# Patient Record
Sex: FEMALE | Race: WHITE | NOT HISPANIC OR LATINO | Employment: STUDENT | URBAN - METROPOLITAN AREA
[De-identification: names, ages, dates, MRNs, and addresses within clinical notes are randomized per-mention and may not be internally consistent; named-entity substitution may affect disease eponyms.]

---
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
## 2019-11-20 ENCOUNTER — TELEPHONE (OUTPATIENT)
Dept: OTOLARYNGOLOGY | Facility: CLINIC | Age: 19
End: 2019-11-20

## 2019-11-20 NOTE — TELEPHONE ENCOUNTER
----- Message from Ira Bryan sent at 11/20/2019 10:37 AM CST -----  Contact: patient  [phone]-please call above patient need to schedule an appointment thanks.

## 2019-11-25 ENCOUNTER — OFFICE VISIT (OUTPATIENT)
Dept: OTOLARYNGOLOGY | Facility: CLINIC | Age: 19
End: 2019-11-25
Payer: COMMERCIAL

## 2019-11-25 VITALS — WEIGHT: 136.88 LBS

## 2019-11-25 DIAGNOSIS — Q35.9 SUBMUCOUS CLEFT PALATE: ICD-10-CM

## 2019-11-25 DIAGNOSIS — H66.93 CHRONIC OTITIS MEDIA OF BOTH EARS: ICD-10-CM

## 2019-11-25 DIAGNOSIS — R62.52 SHORT STATURE: ICD-10-CM

## 2019-11-25 DIAGNOSIS — M41.9 SCOLIOSIS, UNSPECIFIED SCOLIOSIS TYPE, UNSPECIFIED SPINAL REGION: ICD-10-CM

## 2019-11-25 DIAGNOSIS — J45.909 ASTHMA, UNSPECIFIED ASTHMA SEVERITY, UNSPECIFIED WHETHER COMPLICATED, UNSPECIFIED WHETHER PERSISTENT: ICD-10-CM

## 2019-11-25 DIAGNOSIS — H92.12 OTORRHEA OF LEFT EAR: ICD-10-CM

## 2019-11-25 DIAGNOSIS — H72.92 PERFORATED TYMPANIC MEMBRANE, LEFT: ICD-10-CM

## 2019-11-25 DIAGNOSIS — H61.23 BILATERAL IMPACTED CERUMEN: ICD-10-CM

## 2019-11-25 DIAGNOSIS — H92.02 OTALGIA, LEFT EAR: Primary | ICD-10-CM

## 2019-11-25 DIAGNOSIS — H71.12 GRANULOMA OF TYMPANIC MEMBRANE OF LEFT EAR: ICD-10-CM

## 2019-11-25 PROCEDURE — 99999 PR PBB SHADOW E&M-EST. PATIENT-LVL III: CPT | Mod: PBBFAC,,, | Performed by: OTOLARYNGOLOGY

## 2019-11-25 PROCEDURE — 69210 REMOVE IMPACTED EAR WAX UNI: CPT | Mod: S$GLB,,, | Performed by: OTOLARYNGOLOGY

## 2019-11-25 PROCEDURE — 69210 PR REMOVAL IMPACTED CERUMEN REQUIRING INSTRUMENTATION, UNILATERAL: ICD-10-PCS | Mod: S$GLB,,, | Performed by: OTOLARYNGOLOGY

## 2019-11-25 PROCEDURE — 99999 PR PBB SHADOW E&M-EST. PATIENT-LVL III: ICD-10-PCS | Mod: PBBFAC,,, | Performed by: OTOLARYNGOLOGY

## 2019-11-25 PROCEDURE — 99214 OFFICE O/P EST MOD 30 MIN: CPT | Mod: 25,S$GLB,, | Performed by: OTOLARYNGOLOGY

## 2019-11-25 PROCEDURE — 99214 PR OFFICE/OUTPT VISIT, EST, LEVL IV, 30-39 MIN: ICD-10-PCS | Mod: 25,S$GLB,, | Performed by: OTOLARYNGOLOGY

## 2019-11-25 RX ORDER — CIPROFLOXACIN AND DEXAMETHASONE 3; 1 MG/ML; MG/ML
SUSPENSION/ DROPS AURICULAR (OTIC)
Qty: 7.5 ML | Refills: 0 | Status: SHIPPED | OUTPATIENT
Start: 2019-11-25 | End: 2019-11-27 | Stop reason: SDUPTHER

## 2019-11-25 RX ORDER — CLINDAMYCIN HYDROCHLORIDE 300 MG/1
300 CAPSULE ORAL EVERY 8 HOURS
Qty: 42 CAPSULE | Refills: 0 | Status: SHIPPED | OUTPATIENT
Start: 2019-11-25 | End: 2019-12-09

## 2019-11-25 NOTE — PROGRESS NOTES
Subjective:       Patient ID: Jennifer Severino is a 19 y.o. female.    Chief Complaint: Otitis Media and Perforated tympanic membrane AS    HPI     The pt is a 19 y.o. female with a history of pain in the the left ear. The pain has been present for 2 months. The pain is described as moderate. The pain is associated with drainage and HL AS . There is no history of trauma, foreign body insertion and sharp object insertion into the ear.    There is a prior history of tube insertion x 5 AD and x 4 AS. There is a history of a known TM perforation on the affected side. There is no history of wetting the affected ear prior to the onset of the problem.      The patient has been treated with the following ear drops: c dex. The patient has been treated with the following antibiotics: Azithromycin  X 2 in 2 mos . There has been no relief with this treatment.     Has SMCP.      Review of Systems   Constitutional: Negative for chills, fever and unexpected weight change.   HENT: Positive for ear pain ( AS) and hearing loss. Negative for facial swelling and trouble swallowing.         SMCP  C OM w MT x 5 As/x 4 AD   Eyes: Negative for visual disturbance.   Respiratory: Positive for cough. Negative for wheezing and stridor.         Asthma as child - resolved   Cardiovascular: Negative for chest pain.        No CHD   Gastrointestinal: Negative for nausea and vomiting.        Neg for GERD   Genitourinary: Negative for enuresis.        Neg for congenital abn   Musculoskeletal: Negative.  Negative for arthralgias and myalgias.        Scoliosis surg 13 yo    Skin: Negative for rash.        eczema   Neurological: Negative for seizures, speech difficulty and headaches.   Hematological: Negative for adenopathy. Does not bruise/bleed easily.   Psychiatric/Behavioral: Negative for behavioral problems.        ADHD       Objective:      Physical Exam   Constitutional: She is oriented to person, place, and time. She appears well-developed and  well-nourished. No distress.   Short stature; no nasality   HENT:   Head: Normocephalic.   Right Ear: External ear and ear canal normal. No drainage. Tympanic membrane is scarred (patent PET.    ). No middle ear effusion.   Left Ear: External ear and ear canal normal. No drainage. Tympanic membrane is perforated (8%  tiny pinpoint ant inf perf) and erythematous (granular - colymycin ; ci removed 1st). Left ear scarred TM: no PET; 8% perf w nsacnt dc    No middle ear effusion.   Ears:    Nose: Nose normal. No nasal deformity.   Mouth/Throat: Oropharynx is clear and moist and mucous membranes are normal. 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 Tonsils are 1+ on the right. Tonsils are 1+ on the left.       Eyes: Pupils are equal, round, and reactive to light. Conjunctivae, EOM and lids are normal.   Neck: Trachea normal and normal range of motion. No thyroid mass present.   Cardiovascular: Normal rate and regular rhythm.   Pulmonary/Chest: Effort normal and breath sounds normal. No respiratory distress.   Musculoskeletal: Normal range of motion.   Lymphadenopathy:     She has no cervical adenopathy.   Neurological: She is alert and oriented to person, place, and time. No cranial nerve deficit.   Skin: Skin is warm. No rash noted.   Psychiatric: She has a normal mood and affect. Her speech is normal and behavior is normal. Thought content normal.         Cerumen removal: Ears cleared under microscopic vision with curette, forceps and suction as necessary. Child appropriately restrained by parent or/and papoose board.   Assessment:       1. Otalgia, left ear    2. Granuloma of tympanic membrane of left ear    3. Perforated tympanic membrane, left    4. Otorrhea of left ear    5. Chronic otitis media of both ears w MT X 5 AS and x 4 AD     6. Bilateral impacted cerumen    7. Submucous cleft palate    8. Short stature    9. Asthma, unspecified asthma severity, unspecified whether complicated, unspecified  whether persistent    10. Scoliosis, unspecified scoliosis type, unspecified spinal region        Plan:       1. clinda 300 tid x 14 d   (anaphylaxis w Pen ) 2 c dex x 10 d   3 RTC 2 wks

## 2019-11-27 ENCOUNTER — TELEPHONE (OUTPATIENT)
Dept: OTOLARYNGOLOGY | Facility: CLINIC | Age: 19
End: 2019-11-27

## 2019-11-27 RX ORDER — CIPROFLOXACIN AND DEXAMETHASONE 3; 1 MG/ML; MG/ML
SUSPENSION/ DROPS AURICULAR (OTIC)
Qty: 7.5 ML | Refills: 0 | Status: SHIPPED | OUTPATIENT
Start: 2019-11-27 | End: 2020-03-25

## 2019-11-27 RX ORDER — DEXAMETHASONE SODIUM PHOSPHATE 1 MG/ML
SOLUTION/ DROPS OPHTHALMIC
Qty: 5 ML | Refills: 2 | Status: SHIPPED | OUTPATIENT
Start: 2019-11-27 | End: 2020-05-12 | Stop reason: SDUPTHER

## 2019-11-27 NOTE — TELEPHONE ENCOUNTER
----- Message from Vandana Oshea sent at 11/27/2019  2:26 PM CST -----  Contact: [facility]   Type:  Pharmacy Calling to Clarify an RX    Name of Caller:  Gwendolyn   Pharmacy Name:  [facility]   Prescription Name:  Cipro Dex and the second dexamethasone  What do they need to clarify?:  Pharmacy said its duplicate therapy   Best Call Back Number:  [phone]  Additional Information:  None    DAVEY

## 2019-11-27 NOTE — TELEPHONE ENCOUNTER
----- Message from Megan Fragoso sent at 11/27/2019 10:09 AM CST -----  Contact: Jennifer  tel:   [phone]   Caller asking for you to send in to : [facility]   t el:  [phone]  / Asking you to send in Cipro Eye Drops if possible.    Pls call w/ a confirmation when this has been sent.

## 2020-01-06 ENCOUNTER — TELEPHONE (OUTPATIENT)
Dept: OTOLARYNGOLOGY | Facility: CLINIC | Age: 20
End: 2020-01-06

## 2020-01-06 NOTE — TELEPHONE ENCOUNTER
----- Message from Perla Burden sent at 1/6/2020  4:13 PM CST -----  Contact: pt   Maria Fernanda -- pt want to know if you could schedule her to see Dr. Callejas this week or next.  Call back # [phone]

## 2020-01-10 NOTE — PROGRESS NOTES
"Subjective:       Patient ID: Jennifer Severino is a 19 y.o. female.    Chief Complaint: Otalgia AS    HPI       The pt is a 19 y.o. female seen on 11/25/19 with a history of pain in the the left ear.     The hx is as follows. The pain has been present for 2 months. The pain is described as moderate. The pain is associated with drainage and HL AS . There is no history of trauma, foreign body insertion and sharp object insertion into the ear.    There is a prior history of tube insertion x 5 AD and x 4 AS. There is a history of a known TM perforation on the affected side. There is no history of wetting the affected ear prior to the onset of the problem.      The patient has been treated with the following ear drops: c dex. The patient has been treated with the following antibiotics: Azithromycin  X 2 in 2 mos . There has been no relief with this treatment.     Has SMCP.    Had some dc thru perf last ck on 11/25/19 . Rx = 1. clinda 300 tid x 14 d   (anaphylaxis w Pen ) 2 c dex x 10 d  In for FU.    Since her last visit, she noted improvement in ear pain after completing treatment however hearing remains "muffled" in left ear.  Denies any drainage from ear. No balance disturbance.       Review of Systems   Constitutional: Negative for chills, fever and unexpected weight change.   HENT: Positive for ear pain ( AS, improved) and hearing loss. Negative for facial swelling and trouble swallowing.         SMCP  C OM w MT x 5 As/x 4 AD   Eyes: Negative for visual disturbance.   Respiratory: Negative for cough, wheezing and stridor.         Asthma as child - resolved   Cardiovascular: Negative for chest pain.        No CHD   Gastrointestinal: Negative for nausea and vomiting.        Neg for GERD   Genitourinary: Negative for enuresis.        Neg for congenital abn   Musculoskeletal: Negative.  Negative for arthralgias and myalgias.        Scoliosis surg 13 yo    Skin: Negative for rash.        eczema   Neurological: Negative " for seizures, speech difficulty and headaches.   Hematological: Negative for adenopathy. Does not bruise/bleed easily.   Psychiatric/Behavioral: Negative for behavioral problems.        ADHD       Objective:      Physical Exam   Constitutional: She is oriented to person, place, and time. She appears well-developed and well-nourished. No distress.   Short stature; no nasality   HENT:   Head: Normocephalic.   Right Ear: External ear and ear canal normal. There is drainage (scant dc w granuloma on PET). Tympanic membrane is scarred (patent PET.  granulation tissue present.  ). No middle ear effusion.   Left Ear: External ear and ear canal normal. No drainage. Tympanic membrane is perforated (1%  tiny pinpoint ant inf perf ) and erythematous (granular  ). Left ear scarred TM: no PET; 8% perf w nsacnt dc    No middle ear effusion.   Ears:    Nose: Nose normal. No nasal deformity.   Mouth/Throat: Oropharynx is clear and moist and mucous membranes are normal. 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 Tonsils are 1+ on the right. Tonsils are 1+ on the left.       Eyes: Pupils are equal, round, and reactive to light. Conjunctivae, EOM and lids are normal.   Neck: Trachea normal and normal range of motion. No thyroid mass present.   Cardiovascular: Normal rate and regular rhythm.   Pulmonary/Chest: Effort normal and breath sounds normal. No respiratory distress.   Musculoskeletal: Normal range of motion.   Lymphadenopathy:     She has no cervical adenopathy.   Neurological: She is alert and oriented to person, place, and time. No cranial nerve deficit.   Skin: Skin is warm. No rash noted.   Psychiatric: She has a normal mood and affect. Her speech is normal and behavior is normal. Thought content normal.         Cerumen removal: Ears cleared under microscopic vision with curette, forceps and suction as necessary. Child appropriately restrained by parent or/and papoose board.               Assessment:       1.  Perforated tympanic membrane, left- persistent    2. Chronic otitis media of both ears w MT X 5 AS and x 4 AD     3. Granuloma of tympanic membrane of both ears    4. Conductive hearing loss, bilateral    5. Bilateral impacted cerumen    6. Submucous cleft palate    7. Short stature    8. Asthma, unspecified asthma severity, unspecified whether complicated, unspecified whether persistent    9. Scoliosis, unspecified scoliosis type, unspecified spinal region        Plan:       1. cdex x 10 d and prn    2 RTC q 3 mos for cleaning

## 2020-01-15 ENCOUNTER — OFFICE VISIT (OUTPATIENT)
Dept: OTOLARYNGOLOGY | Facility: CLINIC | Age: 20
End: 2020-01-15
Payer: COMMERCIAL

## 2020-01-15 ENCOUNTER — CLINICAL SUPPORT (OUTPATIENT)
Dept: AUDIOLOGY | Facility: CLINIC | Age: 20
End: 2020-01-15
Payer: COMMERCIAL

## 2020-01-15 VITALS — WEIGHT: 138.88 LBS

## 2020-01-15 DIAGNOSIS — H72.92 PERFORATED TYMPANIC MEMBRANE, LEFT: Primary | ICD-10-CM

## 2020-01-15 DIAGNOSIS — J45.909 ASTHMA, UNSPECIFIED ASTHMA SEVERITY, UNSPECIFIED WHETHER COMPLICATED, UNSPECIFIED WHETHER PERSISTENT: ICD-10-CM

## 2020-01-15 DIAGNOSIS — R62.52 SHORT STATURE: ICD-10-CM

## 2020-01-15 DIAGNOSIS — H90.0 CONDUCTIVE HEARING LOSS, BILATERAL: ICD-10-CM

## 2020-01-15 DIAGNOSIS — H61.23 BILATERAL IMPACTED CERUMEN: ICD-10-CM

## 2020-01-15 DIAGNOSIS — H71.13 GRANULOMA OF TYMPANIC MEMBRANE OF BOTH EARS: ICD-10-CM

## 2020-01-15 DIAGNOSIS — Q35.9 SUBMUCOUS CLEFT PALATE: ICD-10-CM

## 2020-01-15 DIAGNOSIS — M41.9 SCOLIOSIS, UNSPECIFIED SCOLIOSIS TYPE, UNSPECIFIED SPINAL REGION: ICD-10-CM

## 2020-01-15 DIAGNOSIS — H90.0 CONDUCTIVE HEARING LOSS, BILATERAL: Primary | ICD-10-CM

## 2020-01-15 DIAGNOSIS — H66.93 CHRONIC OTITIS MEDIA OF BOTH EARS: ICD-10-CM

## 2020-01-15 PROCEDURE — 69210 PR REMOVAL IMPACTED CERUMEN REQUIRING INSTRUMENTATION, UNILATERAL: ICD-10-PCS | Mod: S$GLB,,, | Performed by: OTOLARYNGOLOGY

## 2020-01-15 PROCEDURE — 69210 REMOVE IMPACTED EAR WAX UNI: CPT | Mod: S$GLB,,, | Performed by: OTOLARYNGOLOGY

## 2020-01-15 PROCEDURE — 92557 COMPREHENSIVE HEARING TEST: CPT | Mod: S$GLB,,, | Performed by: AUDIOLOGIST

## 2020-01-15 PROCEDURE — 99999 PR PBB SHADOW E&M-EST. PATIENT-LVL I: ICD-10-PCS | Mod: PBBFAC,,,

## 2020-01-15 PROCEDURE — 92557 PR COMPREHENSIVE HEARING TEST: ICD-10-PCS | Mod: S$GLB,,, | Performed by: AUDIOLOGIST

## 2020-01-15 PROCEDURE — 99214 PR OFFICE/OUTPT VISIT, EST, LEVL IV, 30-39 MIN: ICD-10-PCS | Mod: 25,S$GLB,, | Performed by: OTOLARYNGOLOGY

## 2020-01-15 PROCEDURE — 99999 PR PBB SHADOW E&M-EST. PATIENT-LVL III: CPT | Mod: PBBFAC,,, | Performed by: OTOLARYNGOLOGY

## 2020-01-15 PROCEDURE — 99999 PR PBB SHADOW E&M-EST. PATIENT-LVL III: ICD-10-PCS | Mod: PBBFAC,,, | Performed by: OTOLARYNGOLOGY

## 2020-01-15 PROCEDURE — 99214 OFFICE O/P EST MOD 30 MIN: CPT | Mod: 25,S$GLB,, | Performed by: OTOLARYNGOLOGY

## 2020-01-15 PROCEDURE — 99999 PR PBB SHADOW E&M-EST. PATIENT-LVL I: CPT | Mod: PBBFAC,,,

## 2020-01-15 NOTE — PROGRESS NOTES
Jennifer Severino was seen today in the clinic for an audiologic evaluation.  Patients main complaint was difficulty hearing of both ears.  Ms. Severino reported a history of bilateral hearing loss due to a left TM perforation.    Otoscopy: RIGHT: PE tube noted in place; LEFT: Noted TM perforation  Tympanometry: could not be obtained, unable to obtain seal, Au.    Audiogram results revealed moderate to mild conductive hearing loss (250-500 Hz) to normal hearing (8092-7364 Hz) with a noted conductive component present at 4000 Hz in the right ear and mild conductive hearing loss (250-500 Hz) to normal hearing (8510-1204 Hz) with noted components present at 1 and 4kHz to a mild high frequency hearing loss at 8kHz in the left ear.  Speech reception thresholds were noted at 15 dB in the right ear and 20 dB in the left ear.  Speech discrimination scores were 100% in the right ear and 100% in the left ear.    Recommendations:  1. Otologic evaluation  2. Repeat audiogram as needed  3. Noise protection when in noise

## 2020-03-25 ENCOUNTER — TELEPHONE (OUTPATIENT)
Dept: OTOLARYNGOLOGY | Facility: CLINIC | Age: 20
End: 2020-03-25

## 2020-03-25 RX ORDER — CIPROFLOXACIN AND DEXAMETHASONE 3; 1 MG/ML; MG/ML
SUSPENSION/ DROPS AURICULAR (OTIC)
Qty: 7.5 ML | Refills: 0 | Status: SHIPPED | OUTPATIENT
Start: 2020-03-25 | End: 2020-03-25 | Stop reason: ALTCHOICE

## 2020-03-25 RX ORDER — NEOMYCIN SULFATE, POLYMYXIN B SULFATE AND HYDROCORTISONE 10; 3.5; 1 MG/ML; MG/ML; [USP'U]/ML
SUSPENSION/ DROPS AURICULAR (OTIC)
Qty: 10 ML | Refills: 0 | Status: SHIPPED | OUTPATIENT
Start: 2020-03-25 | End: 2020-05-12

## 2020-03-25 NOTE — TELEPHONE ENCOUNTER
----- Message from Vandana Oshea sent at 3/25/2020  1:37 PM CDT -----  Contact: pts mom   Rx Refill/Request     Is this a Refill or New Rx:  Refill   Rx Name and Strength:  ciprofloxacin-dexamethasone 0.3-0.1% (CIPRODEX) 0.3-0.1 % [place]  Preferred Pharmacy with phone number: [facility] phone number [phone]  Communication Preference: can you please call pts mom at [phone]  Additional Information: none    DAVEY

## 2020-03-25 NOTE — TELEPHONE ENCOUNTER
----- Message from Kathy Montgomerylars sent at 3/25/2020  2:12 PM CDT -----  Contact: pt at [phone]  Britta pt-taina-Pt states that ciprodex was called in today dn ot not covered by her insurance.  Do we have a coupon or can something else be called in.  Pt uses [facility] at [phone].  Freddie pt with update.

## 2020-05-11 ENCOUNTER — TELEPHONE (OUTPATIENT)
Dept: OTOLARYNGOLOGY | Facility: CLINIC | Age: 20
End: 2020-05-11

## 2020-05-11 NOTE — TELEPHONE ENCOUNTER
----- Message from Shira Bryant sent at 5/11/2020  9:30 AM CDT -----  Contact: Patient  Reason: Patient states that none of the medications are working. Have been running fever and states inside ear is really red        Contact: [phone]

## 2020-05-12 ENCOUNTER — OFFICE VISIT (OUTPATIENT)
Dept: OTOLARYNGOLOGY | Facility: CLINIC | Age: 20
End: 2020-05-12
Payer: COMMERCIAL

## 2020-05-12 VITALS — HEIGHT: 56 IN | BODY MASS INDEX: 33.38 KG/M2 | WEIGHT: 148.38 LBS

## 2020-05-12 DIAGNOSIS — H61.23 BILATERAL IMPACTED CERUMEN: ICD-10-CM

## 2020-05-12 DIAGNOSIS — Q35.9 SUBMUCOUS CLEFT PALATE: ICD-10-CM

## 2020-05-12 DIAGNOSIS — H90.0 CONDUCTIVE HEARING LOSS, BILATERAL: ICD-10-CM

## 2020-05-12 DIAGNOSIS — H72.92 PERFORATED TYMPANIC MEMBRANE, LEFT: ICD-10-CM

## 2020-05-12 DIAGNOSIS — H92.03 OTALGIA OF BOTH EARS: Primary | ICD-10-CM

## 2020-05-12 DIAGNOSIS — H71.13 GRANULOMA OF TYMPANIC MEMBRANE OF BOTH EARS: ICD-10-CM

## 2020-05-12 PROCEDURE — 69210 PR REMOVAL IMPACTED CERUMEN REQUIRING INSTRUMENTATION, UNILATERAL: ICD-10-PCS | Mod: S$GLB,,, | Performed by: OTOLARYNGOLOGY

## 2020-05-12 PROCEDURE — 99214 PR OFFICE/OUTPT VISIT, EST, LEVL IV, 30-39 MIN: ICD-10-PCS | Mod: 25,S$GLB,, | Performed by: OTOLARYNGOLOGY

## 2020-05-12 PROCEDURE — 3008F PR BODY MASS INDEX (BMI) DOCUMENTED: ICD-10-PCS | Mod: CPTII,S$GLB,, | Performed by: OTOLARYNGOLOGY

## 2020-05-12 PROCEDURE — 99214 OFFICE O/P EST MOD 30 MIN: CPT | Mod: 25,S$GLB,, | Performed by: OTOLARYNGOLOGY

## 2020-05-12 PROCEDURE — 99999 PR PBB SHADOW E&M-EST. PATIENT-LVL III: ICD-10-PCS | Mod: PBBFAC,,, | Performed by: OTOLARYNGOLOGY

## 2020-05-12 PROCEDURE — 99999 PR PBB SHADOW E&M-EST. PATIENT-LVL III: CPT | Mod: PBBFAC,,, | Performed by: OTOLARYNGOLOGY

## 2020-05-12 PROCEDURE — 3008F BODY MASS INDEX DOCD: CPT | Mod: CPTII,S$GLB,, | Performed by: OTOLARYNGOLOGY

## 2020-05-12 PROCEDURE — 69210 REMOVE IMPACTED EAR WAX UNI: CPT | Mod: S$GLB,,, | Performed by: OTOLARYNGOLOGY

## 2020-05-12 RX ORDER — DEXAMETHASONE SODIUM PHOSPHATE 1 MG/ML
SOLUTION/ DROPS OPHTHALMIC
Qty: 5 ML | Refills: 2 | Status: SHIPPED | OUTPATIENT
Start: 2020-05-12 | End: 2021-10-27

## 2020-05-12 RX ORDER — OFLOXACIN 3 MG/ML
5 SOLUTION AURICULAR (OTIC) 2 TIMES DAILY
Qty: 10 ML | Refills: 5 | Status: SHIPPED | OUTPATIENT
Start: 2020-05-12 | End: 2020-06-25

## 2020-05-12 NOTE — PROGRESS NOTES
"Subjective:       Patient ID: Jennifer Severino is a 19 y.o. female.    Chief Complaint: Otalgia and Otitis Media      The pt is a 19 y.o. female her today for bilateral ear pain that began two months ago. Patient reports she was started on cortisporin drops on 3/25. Pain has persisted. Occasional fever.      The hx is as follows.   The pain is associated with drainage and HL AS . There is no history of trauma, foreign body insertion and sharp object insertion into the ear.    There is a prior history of tube insertion x 5 AD and x 4 AS. There is a history of a known TM perforation on the affected side. There is no history of wetting the affected ear prior to the onset of the problem.      The patient has been treated with the following ear drops: c dex. The patient has been treated with the following antibiotics: Azithromycin  X 2 in 2 mos . There has been no relief with this treatment.     Has SMCP.    Had some dc thru perf last ck on 11/25/19 . Rx = 1. clinda 300 tid x 14 d   (anaphylaxis w Pen ) 2 c dex x 10 d  In for FU.    Since her last visit, she noted improvement in ear pain after completing treatment however hearing remains "muffled" in left ear.  Denies any drainage from ear. No balance disturbance.       Review of Systems   Constitutional: Negative for chills, fever and unexpected weight change.   HENT: Positive for ear pain and hearing loss. Negative for facial swelling and trouble swallowing.         SMCP  C OM w MT x 5 As/x 4 AD   Eyes: Negative for visual disturbance.   Respiratory: Negative for cough, wheezing and stridor.         Asthma as child - resolved   Cardiovascular: Negative for chest pain.        No CHD   Gastrointestinal: Negative for nausea and vomiting.        Neg for GERD   Genitourinary: Negative for enuresis.        Neg for congenital abn   Musculoskeletal: Negative.  Negative for arthralgias and myalgias.        Scoliosis surg 13 yo    Skin: Negative for rash.        eczema "   Neurological: Negative for seizures, speech difficulty and headaches.   Hematological: Negative for adenopathy. Does not bruise/bleed easily.   Psychiatric/Behavioral: Negative for behavioral problems.        ADHD       Objective:      Physical Exam   Constitutional: She is oriented to person, place, and time. She appears well-developed and well-nourished. No distress.   Short stature; no nasality   HENT:   Head: Normocephalic.   Right Ear: External ear and ear canal normal. There is drainage (scant dc w granuloma on PET). Tympanic membrane is scarred (patent PET.  granulation tissue present.  ). No middle ear effusion.   Left Ear: External ear and ear canal normal. No drainage. Tympanic membrane is perforated (cannot see 1%  tiny pinpoint ant inf perf  ) and erythematous (granular  ). Left ear scarred TM: no PET; 8% perf w nsacnt dc    No middle ear effusion.   Ears:    Nose: Nose normal. No nasal deformity.   Mouth/Throat: Oropharynx is clear and moist and mucous membranes are normal. 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 Tonsils are 1+ on the right. Tonsils are 1+ on the left.       Eyes: Pupils are equal, round, and reactive to light. Conjunctivae, EOM and lids are normal.   Neck: Trachea normal and normal range of motion. No thyroid mass present.   Cardiovascular: Normal rate and regular rhythm.   Pulmonary/Chest: Effort normal and breath sounds normal. No respiratory distress.   Musculoskeletal: Normal range of motion.   Lymphadenopathy:     She has no cervical adenopathy.   Neurological: She is alert and oriented to person, place, and time. No cranial nerve deficit.   Skin: Skin is warm. No rash noted.   Psychiatric: She has a normal mood and affect. Her speech is normal and behavior is normal. Thought content normal.         Cerumen removal: Ears cleared under microscopic vision with curette, forceps and suction as necessary. Child appropriately restrained by parent or/and papoose  board.               Assessment:       1. Otalgia of both ears    2. Perforated tympanic membrane, left- persistent    3. Granuloma of tympanic membrane of both ears    4. Conductive hearing loss, bilateral    5. Bilateral impacted cerumen    6. Submucous cleft palate        Plan:       1. Otalgia: decadron optho ggts + ofloxacin    2 RTC q 3 mos for cleaning    3 RTC  3 wks for re ck .

## 2020-06-02 ENCOUNTER — TELEPHONE (OUTPATIENT)
Dept: OTOLARYNGOLOGY | Facility: CLINIC | Age: 20
End: 2020-06-02

## 2020-06-02 NOTE — TELEPHONE ENCOUNTER
----- Message from Kathy Forrestkarey sent at 6/2/2020  4:04 PM CDT -----  Contact: pt at [phone]  Britta Hines-Pt states that you book her appts.  Pt has ear pain.  Doesn't think that her drops are working.

## 2020-06-04 ENCOUNTER — OFFICE VISIT (OUTPATIENT)
Dept: OTOLARYNGOLOGY | Facility: CLINIC | Age: 20
End: 2020-06-04
Payer: COMMERCIAL

## 2020-06-04 ENCOUNTER — CLINICAL SUPPORT (OUTPATIENT)
Dept: AUDIOLOGY | Facility: CLINIC | Age: 20
End: 2020-06-04
Payer: COMMERCIAL

## 2020-06-04 VITALS — WEIGHT: 147.69 LBS | HEIGHT: 55 IN | BODY MASS INDEX: 34.18 KG/M2

## 2020-06-04 DIAGNOSIS — H92.03 OTALGIA OF BOTH EARS: ICD-10-CM

## 2020-06-04 DIAGNOSIS — R62.52 SHORT STATURE: ICD-10-CM

## 2020-06-04 DIAGNOSIS — H90.0 CONDUCTIVE HEARING LOSS, BILATERAL: ICD-10-CM

## 2020-06-04 DIAGNOSIS — H69.93 DYSFUNCTION OF BOTH EUSTACHIAN TUBES: ICD-10-CM

## 2020-06-04 DIAGNOSIS — H90.0 CONDUCTIVE HEARING LOSS OF BOTH EARS: Primary | ICD-10-CM

## 2020-06-04 DIAGNOSIS — Q35.9 SUBMUCOUS CLEFT PALATE: ICD-10-CM

## 2020-06-04 DIAGNOSIS — H65.92 MIDDLE EAR EFFUSION, LEFT: Primary | ICD-10-CM

## 2020-06-04 PROCEDURE — 99999 PR PBB SHADOW E&M-EST. PATIENT-LVL II: ICD-10-PCS | Mod: PBBFAC,,, | Performed by: OTOLARYNGOLOGY

## 2020-06-04 PROCEDURE — 99214 PR OFFICE/OUTPT VISIT, EST, LEVL IV, 30-39 MIN: ICD-10-PCS | Mod: S$GLB,,, | Performed by: OTOLARYNGOLOGY

## 2020-06-04 PROCEDURE — 3008F PR BODY MASS INDEX (BMI) DOCUMENTED: ICD-10-PCS | Mod: CPTII,S$GLB,, | Performed by: OTOLARYNGOLOGY

## 2020-06-04 PROCEDURE — 99999 PR PBB SHADOW E&M-EST. PATIENT-LVL I: ICD-10-PCS | Mod: PBBFAC,,,

## 2020-06-04 PROCEDURE — 92567 TYMPANOMETRY: CPT | Mod: S$GLB,,, | Performed by: AUDIOLOGIST

## 2020-06-04 PROCEDURE — 99999 PR PBB SHADOW E&M-EST. PATIENT-LVL II: CPT | Mod: PBBFAC,,, | Performed by: OTOLARYNGOLOGY

## 2020-06-04 PROCEDURE — 3008F BODY MASS INDEX DOCD: CPT | Mod: CPTII,S$GLB,, | Performed by: OTOLARYNGOLOGY

## 2020-06-04 PROCEDURE — 92567 PR TYMPA2METRY: ICD-10-PCS | Mod: S$GLB,,, | Performed by: AUDIOLOGIST

## 2020-06-04 PROCEDURE — 92557 PR COMPREHENSIVE HEARING TEST: ICD-10-PCS | Mod: S$GLB,,, | Performed by: AUDIOLOGIST

## 2020-06-04 PROCEDURE — 99999 PR PBB SHADOW E&M-EST. PATIENT-LVL I: CPT | Mod: PBBFAC,,,

## 2020-06-04 PROCEDURE — 99214 OFFICE O/P EST MOD 30 MIN: CPT | Mod: S$GLB,,, | Performed by: OTOLARYNGOLOGY

## 2020-06-04 PROCEDURE — 92557 COMPREHENSIVE HEARING TEST: CPT | Mod: S$GLB,,, | Performed by: AUDIOLOGIST

## 2020-06-04 RX ORDER — AZITHROMYCIN 500 MG/1
500 TABLET, FILM COATED ORAL DAILY
Qty: 5 TABLET | Refills: 0 | Status: SHIPPED | OUTPATIENT
Start: 2020-06-04 | End: 2020-06-09

## 2020-06-04 NOTE — PROGRESS NOTES
Jennifer Severino was seen in the clinic today for an audiological evaluation.  Jennifer has a history of conductive hearing loss of both ears.    Audiological testing revealed a moderate rising to mild low frequency conductive hearing loss for the right ear and a mild low to mid frequency conductive hearing loss for the left ear.  A speech reception threshold was obtained at 20 dBHL for the right ear and at 20 dBHL for the left ear.  Speech discrimination was 100% for the right ear and 92% for the left ear.      Tympanometry testing revealed a Type B (normal ear canal volume) tympanogram for the right ear and a Type B (normal ear canal volume) tympanogram for the left ear.      Recommendations:  1. Otologic evaluation  2. Audiological evaluation, as needed  3. Hearing protection when in noise

## 2020-06-04 NOTE — PROGRESS NOTES
"Subjective:       Patient ID: Jennifer Severino is a 19 y.o. female.    Chief Complaint: Otalgia    HPI    The pt is a 19 y.o. female her today for persistent ear pain that began two months ago. Patient was seen on 5/12/20. Started on decadron and ofloxacin gtts for granuloma. Pain has not improved. Patient reports hearing in affected AD > AS. Sounds very muffled on the right side.       The hx is as follows.   The pain is associated with drainage and HL AS . There is no history of trauma, foreign body insertion and sharp object insertion into the ear.    There is a prior history of tube insertion x 5 AD and x 4 AS. There is a history of a known TM perforation on the affected side. There is no history of wetting the affected ear prior to the onset of the problem.      The patient has been treated with the following ear drops: c dex. The patient has been treated with the following antibiotics: Azithromycin  X 2 in 2 mos . There has been no relief with this treatment.     Has SMCP.    Had some dc thru perf last ck on 11/25/19 . Rx = 1. clinda 300 tid x 14 d   (anaphylaxis w Pen ) 2 c dex x 10 d  In for FU.    Since her last visit, she noted improvement in ear pain after completing treatment however hearing remains "muffled" in left ear.  Denies any drainage from ear. No balance disturbance.     2004: patient had tympanoplasty without a graft due to lysis of adhesions     Review of Systems   Constitutional: Negative for chills, fever and unexpected weight change.   HENT: Positive for ear pain and hearing loss. Negative for facial swelling and trouble swallowing.         SMCP  C OM w MT x 5 As/x 4 AD   Eyes: Negative for visual disturbance.   Respiratory: Negative for cough, wheezing and stridor.         Asthma as child - resolved   Cardiovascular: Negative for chest pain.        No CHD   Gastrointestinal: Negative for nausea and vomiting.        Neg for GERD   Genitourinary: Negative for enuresis.        Neg for " congenital abn   Musculoskeletal: Negative.  Negative for arthralgias and myalgias.        Scoliosis surg 13 yo    Skin: Negative for rash.        eczema   Neurological: Negative for seizures, speech difficulty and headaches.   Hematological: Negative for adenopathy. Does not bruise/bleed easily.   Psychiatric/Behavioral: Negative for behavioral problems.        ADHD       Objective:      Physical Exam   Constitutional: She is oriented to person, place, and time. She appears well-developed and well-nourished. No distress.   Short stature; no nasality   HENT:   Head: Normocephalic.   Right Ear: External ear and ear canal normal. No drainage. Tympanic membrane is scarred (patent PET). No middle ear effusion (tube patent and in place).   Left Ear: External ear and ear canal normal. No drainage. Tympanic membrane is scarred (s/p tympanoplasty without graft 2004) and erythematous (granular  ). Tympanic membrane is not perforated (Perf closed).  No middle ear effusion.   Nose: Nose normal. No nasal deformity.   Mouth/Throat: Oropharynx is clear and moist and mucous membranes are normal. 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 Tonsils are 1+ on the right. Tonsils are 1+ on the left.       Eyes: Pupils are equal, round, and reactive to light. Conjunctivae, EOM and lids are normal.   Neck: Trachea normal and normal range of motion. No thyroid mass present.   Cardiovascular: Normal rate and regular rhythm.   Pulmonary/Chest: Effort normal and breath sounds normal. No respiratory distress.   Musculoskeletal: Normal range of motion.   Lymphadenopathy:     She has no cervical adenopathy.   Neurological: She is alert and oriented to person, place, and time. No cranial nerve deficit.   Skin: Skin is warm. No rash noted.   Psychiatric: She has a normal mood and affect. Her speech is normal and behavior is normal. Thought content normal.         Cerumen removal: Ears cleared under microscopic vision with curette,  forceps and suction as necessary. Child appropriately restrained by parent or/and papoose board.                   Assessment:       1. Middle ear effusion, left    2. Otalgia of both ears    3. Conductive hearing loss, bilateral    4. Submucous cleft palate    5. Short stature        Plan:       1. Azithromycin x 5days    2 RTC  3 weeks for re ck .

## 2020-06-24 NOTE — PROGRESS NOTES
Subjective:       Patient ID: Jennifer Severino is a 19 y.o. female.    Chief Complaint: Follow-up    HPI    The pt is a 19 y.o. female seen on 6/4/20 for persistent ear pain that began two months prior. Also had muffled hearing AU. Dx w LETICIA and granular TM AS . Perf in AS was closed. T tube in AD w no dc.     Rx w z max. In for FU. Feels better but occas intermittent sever pain AS. No other s/sx.     There is a prior history of tube insertion x 5 AD and x 4 AS. There is a history of a known TM perforation on the affected side. There is no history of wetting the affected ear prior to the onset of the problem.      Has SMCP.    2004: patient had tympanoplasty without a graft due to lysis of adhesions.             Review of Systems   Constitutional: Negative for chills, fever and unexpected weight change.   HENT: Positive for ear pain and hearing loss. Negative for facial swelling and trouble swallowing.         SMCP  C OM w MT x 5 As/x 4 AD  Tplasty AS w T tube after lysis of ME adhesions . Very limited ME space.    Eyes: Negative for visual disturbance.   Respiratory: Negative for cough, wheezing and stridor.         Asthma as child - resolved   Cardiovascular: Negative for chest pain.        No CHD   Gastrointestinal: Negative for nausea and vomiting.        Neg for GERD   Genitourinary: Negative for enuresis.        Neg for congenital abn   Musculoskeletal: Negative.  Negative for arthralgias and myalgias.        Scoliosis surg 13 yo    Skin: Negative for rash.        eczema   Neurological: Negative for seizures, speech difficulty and headaches.   Hematological: Negative for adenopathy. Does not bruise/bleed easily.   Psychiatric/Behavioral: Negative for behavioral problems.        ADHD       Objective:      Physical Exam  Constitutional:       General: She is not in acute distress.     Appearance: She is well-developed.      Comments: Short stature; no nasality   HENT:      Head: Normocephalic.      Right Ear: Ear  canal and external ear normal. No drainage. No middle ear effusion (tube patent and in place). There is impacted cerumen. A PE tube is present. Tympanic membrane is scarred (patent PET) and perforated (around PET ).      Left Ear: Ear canal and external ear normal. No drainage. A middle ear effusion is present. There is impacted cerumen. Tympanic membrane is scarred (s/p tympanoplasty without graft 2004), erythematous (granular  ) and retracted ( severe ; TM thick; no recognizable ME structures ). Tympanic membrane is not perforated (Perf closed).      Nose: Nose normal. No nasal deformity.      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      Tonsils: 1+ on the right. 1+ on the left.     Eyes:      General: Lids are normal.      Conjunctiva/sclera: Conjunctivae normal.      Pupils: Pupils are equal, round, and reactive to light.   Neck:      Musculoskeletal: Normal range of motion.      Thyroid: No thyroid mass.      Trachea: Trachea normal.   Cardiovascular:      Rate and Rhythm: Normal rate and regular rhythm.   Pulmonary:      Effort: Pulmonary effort is normal. No respiratory distress.      Breath sounds: Normal breath sounds.   Musculoskeletal: Normal range of motion.   Lymphadenopathy:      Cervical: No cervical adenopathy.   Skin:     General: Skin is warm.      Findings: No rash.   Neurological:      Mental Status: She is alert and oriented to person, place, and time.      Cranial Nerves: No cranial nerve deficit.   Psychiatric:         Speech: Speech normal.         Behavior: Behavior normal.         Thought Content: Thought content normal.           Cerumen removal: Ears cleared under microscopic vision with curette, forceps and suction as necessary. Child appropriately restrained by parent or/and papoose board.                   Assessment:       1. Middle ear effusion, left    2. Retraction of tympanic membrane of left ear    3. Conductive hearing loss, bilateral     4. Chronic otitis media of both ears w MT X 5 AS and x 4 AD     5. Perforated tympanic membrane, left- closed    6. Submucous cleft palate    7. Short stature        Plan:       1. Follow closely      2 RTC  6 weeks for re ck . Would like to avoid surg AS if possible. Would likely need repeat adhesiolysis of ME thru T plasty approach

## 2020-06-25 ENCOUNTER — OFFICE VISIT (OUTPATIENT)
Dept: OTOLARYNGOLOGY | Facility: CLINIC | Age: 20
End: 2020-06-25
Payer: COMMERCIAL

## 2020-06-25 VITALS — WEIGHT: 142 LBS | BODY MASS INDEX: 33 KG/M2

## 2020-06-25 DIAGNOSIS — H90.0 CONDUCTIVE HEARING LOSS, BILATERAL: ICD-10-CM

## 2020-06-25 DIAGNOSIS — H66.93 CHRONIC OTITIS MEDIA OF BOTH EARS: ICD-10-CM

## 2020-06-25 DIAGNOSIS — H65.92 MIDDLE EAR EFFUSION, LEFT: Primary | ICD-10-CM

## 2020-06-25 DIAGNOSIS — Q35.9 SUBMUCOUS CLEFT PALATE: ICD-10-CM

## 2020-06-25 DIAGNOSIS — H73.892 RETRACTION OF TYMPANIC MEMBRANE OF LEFT EAR: ICD-10-CM

## 2020-06-25 DIAGNOSIS — H72.92 PERFORATED TYMPANIC MEMBRANE, LEFT: ICD-10-CM

## 2020-06-25 DIAGNOSIS — R62.52 SHORT STATURE: ICD-10-CM

## 2020-06-25 PROCEDURE — 69210 PR REMOVAL IMPACTED CERUMEN REQUIRING INSTRUMENTATION, UNILATERAL: ICD-10-PCS | Mod: S$GLB,,, | Performed by: OTOLARYNGOLOGY

## 2020-06-25 PROCEDURE — 3008F BODY MASS INDEX DOCD: CPT | Mod: CPTII,S$GLB,, | Performed by: OTOLARYNGOLOGY

## 2020-06-25 PROCEDURE — 99999 PR PBB SHADOW E&M-EST. PATIENT-LVL III: CPT | Mod: PBBFAC,,, | Performed by: OTOLARYNGOLOGY

## 2020-06-25 PROCEDURE — 99214 PR OFFICE/OUTPT VISIT, EST, LEVL IV, 30-39 MIN: ICD-10-PCS | Mod: 25,S$GLB,, | Performed by: OTOLARYNGOLOGY

## 2020-06-25 PROCEDURE — 69210 REMOVE IMPACTED EAR WAX UNI: CPT | Mod: S$GLB,,, | Performed by: OTOLARYNGOLOGY

## 2020-06-25 PROCEDURE — 99999 PR PBB SHADOW E&M-EST. PATIENT-LVL III: ICD-10-PCS | Mod: PBBFAC,,, | Performed by: OTOLARYNGOLOGY

## 2020-06-25 PROCEDURE — 99214 OFFICE O/P EST MOD 30 MIN: CPT | Mod: 25,S$GLB,, | Performed by: OTOLARYNGOLOGY

## 2020-06-25 PROCEDURE — 3008F PR BODY MASS INDEX (BMI) DOCUMENTED: ICD-10-PCS | Mod: CPTII,S$GLB,, | Performed by: OTOLARYNGOLOGY

## 2020-06-25 RX ORDER — NORETHINDRONE ACETATE AND ETHINYL ESTRADIOL, ETHINYL ESTRADIOL AND FERROUS FUMARATE 1MG-10(24)
KIT ORAL
COMMUNITY
Start: 2020-06-19 | End: 2022-12-07

## 2020-09-03 ENCOUNTER — HOSPITAL ENCOUNTER (OUTPATIENT)
Dept: PREADMISSION TESTING | Facility: HOSPITAL | Age: 20
Discharge: HOME | End: 2020-09-03
Attending: PHYSICIAN ASSISTANT
Payer: COMMERCIAL

## 2020-10-21 ENCOUNTER — LAB VISIT (OUTPATIENT)
Dept: LAB | Facility: HOSPITAL | Age: 20
End: 2020-10-21
Attending: ALLERGY & IMMUNOLOGY
Payer: COMMERCIAL

## 2020-10-21 ENCOUNTER — OFFICE VISIT (OUTPATIENT)
Dept: ALLERGY | Facility: CLINIC | Age: 20
End: 2020-10-21
Payer: COMMERCIAL

## 2020-10-21 VITALS
HEART RATE: 89 BPM | TEMPERATURE: 99 F | SYSTOLIC BLOOD PRESSURE: 113 MMHG | HEIGHT: 55 IN | BODY MASS INDEX: 32.91 KG/M2 | WEIGHT: 142.19 LBS | DIASTOLIC BLOOD PRESSURE: 80 MMHG

## 2020-10-21 DIAGNOSIS — L50.9 URTICARIA: ICD-10-CM

## 2020-10-21 DIAGNOSIS — L50.9 URTICARIA: Primary | ICD-10-CM

## 2020-10-21 DIAGNOSIS — L50.3 DERMOGRAPHIA: ICD-10-CM

## 2020-10-21 LAB
ALBUMIN SERPL BCP-MCNC: 4.2 G/DL (ref 3.5–5.2)
ALP SERPL-CCNC: 69 U/L (ref 55–135)
ALT SERPL W/O P-5'-P-CCNC: 18 U/L (ref 10–44)
ANION GAP SERPL CALC-SCNC: 9 MMOL/L (ref 8–16)
AST SERPL-CCNC: 16 U/L (ref 10–40)
BASOPHILS # BLD AUTO: 0.04 K/UL (ref 0–0.2)
BASOPHILS NFR BLD: 0.6 % (ref 0–1.9)
BILIRUB SERPL-MCNC: 0.5 MG/DL (ref 0.1–1)
BUN SERPL-MCNC: 11 MG/DL (ref 6–20)
CALCIUM SERPL-MCNC: 9.7 MG/DL (ref 8.7–10.5)
CHLORIDE SERPL-SCNC: 105 MMOL/L (ref 95–110)
CO2 SERPL-SCNC: 26 MMOL/L (ref 23–29)
CREAT SERPL-MCNC: 0.7 MG/DL (ref 0.5–1.4)
DIFFERENTIAL METHOD: NORMAL
EOSINOPHIL # BLD AUTO: 0.1 K/UL (ref 0–0.5)
EOSINOPHIL NFR BLD: 2.1 % (ref 0–8)
ERYTHROCYTE [DISTWIDTH] IN BLOOD BY AUTOMATED COUNT: 11.9 % (ref 11.5–14.5)
EST. GFR  (AFRICAN AMERICAN): >60 ML/MIN/1.73 M^2
EST. GFR  (NON AFRICAN AMERICAN): >60 ML/MIN/1.73 M^2
GLUCOSE SERPL-MCNC: 83 MG/DL (ref 70–110)
HCT VFR BLD AUTO: 45.9 % (ref 37–48.5)
HGB BLD-MCNC: 14.7 G/DL (ref 12–16)
IMM GRANULOCYTES # BLD AUTO: 0.02 K/UL (ref 0–0.04)
IMM GRANULOCYTES NFR BLD AUTO: 0.3 % (ref 0–0.5)
LYMPHOCYTES # BLD AUTO: 1.8 K/UL (ref 1–4.8)
LYMPHOCYTES NFR BLD: 28.4 % (ref 18–48)
MCH RBC QN AUTO: 29.9 PG (ref 27–31)
MCHC RBC AUTO-ENTMCNC: 32 G/DL (ref 32–36)
MCV RBC AUTO: 94 FL (ref 82–98)
MONOCYTES # BLD AUTO: 0.4 K/UL (ref 0.3–1)
MONOCYTES NFR BLD: 6.9 % (ref 4–15)
NEUTROPHILS # BLD AUTO: 3.8 K/UL (ref 1.8–7.7)
NEUTROPHILS NFR BLD: 61.7 % (ref 38–73)
NRBC BLD-RTO: 0 /100 WBC
PLATELET # BLD AUTO: 213 K/UL (ref 150–350)
PMV BLD AUTO: 12.5 FL (ref 9.2–12.9)
POTASSIUM SERPL-SCNC: 4 MMOL/L (ref 3.5–5.1)
PROT SERPL-MCNC: 7.2 G/DL (ref 6–8.4)
RBC # BLD AUTO: 4.91 M/UL (ref 4–5.4)
SODIUM SERPL-SCNC: 140 MMOL/L (ref 136–145)
WBC # BLD AUTO: 6.2 K/UL (ref 3.9–12.7)

## 2020-10-21 PROCEDURE — 99204 PR OFFICE/OUTPT VISIT, NEW, LEVL IV, 45-59 MIN: ICD-10-PCS | Mod: S$GLB,,, | Performed by: ALLERGY & IMMUNOLOGY

## 2020-10-21 PROCEDURE — 86803 HEPATITIS C AB TEST: CPT

## 2020-10-21 PROCEDURE — 84165 PROTEIN E-PHORESIS SERUM: CPT

## 2020-10-21 PROCEDURE — 36415 COLL VENOUS BLD VENIPUNCTURE: CPT

## 2020-10-21 PROCEDURE — 99999 PR PBB SHADOW E&M-EST. PATIENT-LVL III: CPT | Mod: PBBFAC,,, | Performed by: ALLERGY & IMMUNOLOGY

## 2020-10-21 PROCEDURE — 84443 ASSAY THYROID STIM HORMONE: CPT

## 2020-10-21 PROCEDURE — 84165 PATHOLOGIST INTERPRETATION SPE: ICD-10-PCS | Mod: 26,,, | Performed by: PATHOLOGY

## 2020-10-21 PROCEDURE — 86705 HEP B CORE ANTIBODY IGM: CPT

## 2020-10-21 PROCEDURE — 86703 HIV-1/HIV-2 1 RESULT ANTBDY: CPT

## 2020-10-21 PROCEDURE — 80053 COMPREHEN METABOLIC PANEL: CPT

## 2020-10-21 PROCEDURE — 99204 OFFICE O/P NEW MOD 45 MIN: CPT | Mod: S$GLB,,, | Performed by: ALLERGY & IMMUNOLOGY

## 2020-10-21 PROCEDURE — 86592 SYPHILIS TEST NON-TREP QUAL: CPT

## 2020-10-21 PROCEDURE — 3008F BODY MASS INDEX DOCD: CPT | Mod: CPTII,S$GLB,, | Performed by: ALLERGY & IMMUNOLOGY

## 2020-10-21 PROCEDURE — 86038 ANTINUCLEAR ANTIBODIES: CPT

## 2020-10-21 PROCEDURE — 3008F PR BODY MASS INDEX (BMI) DOCUMENTED: ICD-10-PCS | Mod: CPTII,S$GLB,, | Performed by: ALLERGY & IMMUNOLOGY

## 2020-10-21 PROCEDURE — 84165 PROTEIN E-PHORESIS SERUM: CPT | Mod: 26,,, | Performed by: PATHOLOGY

## 2020-10-21 PROCEDURE — 86003 ALLG SPEC IGE CRUDE XTRC EA: CPT

## 2020-10-21 PROCEDURE — 85025 COMPLETE CBC W/AUTO DIFF WBC: CPT

## 2020-10-21 PROCEDURE — 99999 PR PBB SHADOW E&M-EST. PATIENT-LVL III: ICD-10-PCS | Mod: PBBFAC,,, | Performed by: ALLERGY & IMMUNOLOGY

## 2020-10-21 RX ORDER — HYDROXYZINE HYDROCHLORIDE 25 MG/1
25 TABLET, FILM COATED ORAL 3 TIMES DAILY
Qty: 90 TABLET | Refills: 3 | Status: SHIPPED | OUTPATIENT
Start: 2020-10-21 | End: 2021-10-27

## 2020-10-21 RX ORDER — FAMOTIDINE 40 MG/1
40 TABLET, FILM COATED ORAL DAILY
Qty: 30 TABLET | Refills: 11 | Status: SHIPPED | OUTPATIENT
Start: 2020-10-21 | End: 2021-10-27 | Stop reason: SDUPTHER

## 2020-10-21 NOTE — PATIENT INSTRUCTIONS
Increase Cetirizine to twice a day- morning and night  Hydroxyzine as needed.  Continue Famotidine

## 2020-10-21 NOTE — PROGRESS NOTES
Subjective:       Patient ID: Jennifer Severino is a 20 y.o. female.    Initial Visit  Self Referral    Chief Complaint:  Urticaria      HPI: 20 year old female complaining of hives for the last month daily. Symptoms are worse at night, and with a hot bath. She reports that hives are worse when she becomes over heated. Hives can occur, when she is not overheated. Lesions last less than 24 hours. Lesions do not scar. Lesions itch, but they do not burn. She can not associate hives with water, the sun or exercise. 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 She was prescribed prednisone and famotidine, which helped. She is taking Benadryl, as needed. She is also taking Cetirizine daily.      Past Medical History:  See above  Scoliosis as a child    Family History:  Hypertension  Hyperlipidemia  GM- thyroid cancer  Mom and aunt- hyperthyroidism  Dad- diabetic, CAD    Current Outpatient Medications on File Prior to Visit   Medication Sig Dispense Refill    cetirizine (ZYRTEC) 10 MG tablet Take 10 mg by mouth once daily.      fluticasone (FLONASE) 50 mcg/actuation nasal spray 1 spray by Each Nare route once daily.      LO LOESTRIN FE 1 mg-10 mcg (24)/10 mcg (2) Tab       dexamethasone (DECADRON) 0.1 % ophthalmic solution 3-4 gtts AU ( yes, ears ) bid with Floxin otioc. (Patient not taking: Reported on 10/21/2020) 5 mL 2    loratadine (CLARITIN) 10 mg tablet Take 10 mg by mouth once daily.      mometasone (NASONEX) 50 mcg/actuation nasal spray 2 sprays by Nasal route once daily.      norethindrone-ethinyl estradiol-iron (MICROGESTIN FE1.5/30) 1.5 mg-30 mcg (21)/75 mg (7) tablet Take 1 tablet by mouth once daily.       No current facility-administered medications on file prior to visit.      Review of patient's allergies indicates:   Allergen Reactions    Alpha-d-galactosidase      Eggs, milk, peanuts, shrimp, and fish     Food allergy formula  [glutamine-c-quercet-selen-brom] Anaphylaxis     Eggs, milk, shrimp, peanuts, fish     Penicillins Rash and Anaphylaxis    Adhesive tape-silicones Rash    Iodine and iodide containing products Rash         Environmental History: No pets. Not a smoker  Review of Systems   Constitutional: Negative for chills and fever.   HENT: Positive for rhinorrhea. Negative for congestion.    Eyes: Negative for discharge and itching.   Respiratory: Negative for chest tightness, shortness of breath and wheezing.    Cardiovascular: Negative for chest pain.   Gastrointestinal: Negative for nausea and vomiting.   Skin: Positive for rash. Negative for pallor and wound.   Allergic/Immunologic: Positive for environmental allergies. Negative for food allergies.   Neurological: Negative for facial asymmetry and speech difficulty.   Hematological: Negative for adenopathy. Does not bruise/bleed easily.   Psychiatric/Behavioral: Negative for behavioral problems.        Objective:    Physical Exam  Vitals signs reviewed.   Constitutional:       General: She is not in acute distress.     Appearance: She is well-developed. She is not diaphoretic.   HENT:      Head: Normocephalic and atraumatic.      Right Ear: External ear normal.      Left Ear: External ear normal.      Nose: Nose normal.      Mouth/Throat:      Pharynx: No oropharyngeal exudate.   Eyes:      General: No scleral icterus.        Right eye: No discharge.         Left eye: No discharge.      Pupils: Pupils are equal, round, and reactive to light.   Neck:      Musculoskeletal: Normal range of motion and neck supple.      Thyroid: No thyromegaly.   Cardiovascular:      Rate and Rhythm: Normal rate and regular rhythm.      Heart sounds: Normal heart sounds. No murmur.   Pulmonary:      Effort: Pulmonary effort is normal. No respiratory distress.      Breath sounds: Normal breath sounds. No stridor. No wheezing or rales.   Abdominal:      General: Bowel sounds are normal. There is no distension.      Palpations: Abdomen is soft.      Tenderness: There is no abdominal  tenderness. There is no guarding.   Musculoskeletal: Normal range of motion.   Lymphadenopathy:      Cervical: No cervical adenopathy.   Skin:     General: Skin is warm.      Coloration: Skin is not pale.      Findings: Rash present. No erythema.      Comments: dermographia   Neurological:      Mental Status: She is alert and oriented to person, place, and time.   Psychiatric:         Thought Content: Thought content normal.         Judgment: Judgment normal.               Assessment:       1. Urticaria    2. Dermographia         Plan:       Continue Famotidine. Increase Cetirizine 10 mg BID.  Prescribing Hydroxyzine    Urticaria  -     hydrOXYzine HCL (ATARAX) 25 MG tablet; Take 1 tablet (25 mg total) by mouth 3 (three) times daily.  Dispense: 90 tablet; Refill: 3  -     CU (Chronic Urticaria) Index Panel; Future; Expected date: 10/21/2020  -     SULAIMAN Screen w/Reflex; Future; Expected date: 10/21/2020  -     CBC auto differential; Future; Expected date: 10/21/2020  -     Protein Electrophoresis, Serum; Future; Expected date: 10/21/2020  -     Stool Exam-Ova,Cysts,Parasites; Future; Expected date: 10/21/2020  -     Ascaris IgE; Future; Expected date: 10/21/2020  -     RPR; Future; Expected date: 10/21/2020  -     Comprehensive Metabolic Panel; Future; Expected date: 10/21/2020  -     HIV 1/2 Ag/Ab (4th Gen); Future; Expected date: 10/21/2020  -     Hepatitis C Antibody; Future; Expected date: 10/21/2020  -     Hepatitis B Core Antibody, IgM; Future; Expected date: 10/21/2020    Dermographia    Risk and benefits of medications explained.  RTC 3 weeks or sooner, if needed.  BHASKAR SCHMITT

## 2020-10-22 ENCOUNTER — TELEPHONE (OUTPATIENT)
Dept: ALLERGY | Facility: CLINIC | Age: 20
End: 2020-10-22

## 2020-10-22 LAB
ALBUMIN SERPL ELPH-MCNC: 4.14 G/DL (ref 3.35–5.55)
ALPHA1 GLOB SERPL ELPH-MCNC: 0.31 G/DL (ref 0.17–0.41)
ALPHA2 GLOB SERPL ELPH-MCNC: 0.68 G/DL (ref 0.43–0.99)
ANA SER QL IF: NORMAL
B-GLOBULIN SERPL ELPH-MCNC: 0.84 G/DL (ref 0.5–1.1)
GAMMA GLOB SERPL ELPH-MCNC: 0.73 G/DL (ref 0.67–1.58)
HBV CORE IGM SERPL QL IA: NEGATIVE
HCV AB SERPL QL IA: NEGATIVE
HIV 1+2 AB+HIV1 P24 AG SERPL QL IA: NEGATIVE
PATHOLOGIST INTERPRETATION SPE: NORMAL
PROT SERPL-MCNC: 6.7 G/DL (ref 6–8.4)
RPR SER QL: NORMAL

## 2020-10-22 NOTE — TELEPHONE ENCOUNTER
----- Message from Deyanira Huddleston MD sent at 10/22/2020  1:45 PM CDT -----  Labs thus far are normal, but several labs are pending.

## 2020-10-22 NOTE — TELEPHONE ENCOUNTER
Called pt and informed her via voicemail of normal results and that I will call her back once the other results come in, advised pt to call back if she had any questions or concerns.

## 2020-10-23 ENCOUNTER — LAB VISIT (OUTPATIENT)
Dept: LAB | Facility: HOSPITAL | Age: 20
End: 2020-10-23
Attending: ALLERGY & IMMUNOLOGY
Payer: COMMERCIAL

## 2020-10-23 DIAGNOSIS — L50.9 URTICARIA: ICD-10-CM

## 2020-10-23 PROCEDURE — 87209 SMEAR COMPLEX STAIN: CPT

## 2020-10-26 LAB
A LUMBRIC IGE QN: <0.1 KU/L
DEPRECATED A LUMBRIC IGE RAST QL: 0

## 2020-10-27 LAB
CU INDEX: <3
CU, ANTI-THYROGLOBULIN IGG: <20 IU/ML
CU, ANTI-THYROID PEROXIDASE IGG: <10 IU/ML
CU, TSH (THYROTROPIN): 1.01 UIU/ML (ref 0.4–4)
O+P STL MICRO: NORMAL

## 2020-11-11 ENCOUNTER — OFFICE VISIT (OUTPATIENT)
Dept: ALLERGY | Facility: CLINIC | Age: 20
End: 2020-11-11
Payer: COMMERCIAL

## 2020-11-11 VITALS
HEART RATE: 61 BPM | BODY MASS INDEX: 32.59 KG/M2 | DIASTOLIC BLOOD PRESSURE: 66 MMHG | TEMPERATURE: 99 F | WEIGHT: 140.19 LBS | SYSTOLIC BLOOD PRESSURE: 100 MMHG

## 2020-11-11 DIAGNOSIS — L50.1 CHRONIC IDIOPATHIC URTICARIA: Primary | ICD-10-CM

## 2020-11-11 PROCEDURE — 99999 PR PBB SHADOW E&M-EST. PATIENT-LVL III: CPT | Mod: PBBFAC,,, | Performed by: ALLERGY & IMMUNOLOGY

## 2020-11-11 PROCEDURE — 3008F BODY MASS INDEX DOCD: CPT | Mod: CPTII,S$GLB,, | Performed by: ALLERGY & IMMUNOLOGY

## 2020-11-11 PROCEDURE — 3008F PR BODY MASS INDEX (BMI) DOCUMENTED: ICD-10-PCS | Mod: CPTII,S$GLB,, | Performed by: ALLERGY & IMMUNOLOGY

## 2020-11-11 PROCEDURE — 99999 PR PBB SHADOW E&M-EST. PATIENT-LVL III: ICD-10-PCS | Mod: PBBFAC,,, | Performed by: ALLERGY & IMMUNOLOGY

## 2020-11-11 PROCEDURE — 99214 OFFICE O/P EST MOD 30 MIN: CPT | Mod: S$GLB,,, | Performed by: ALLERGY & IMMUNOLOGY

## 2020-11-11 PROCEDURE — 99214 PR OFFICE/OUTPT VISIT, EST, LEVL IV, 30-39 MIN: ICD-10-PCS | Mod: S$GLB,,, | Performed by: ALLERGY & IMMUNOLOGY

## 2020-11-11 NOTE — PROGRESS NOTES
Subjective:       Patient ID: Jennifer Severino is a 20 y.o. female.      Chief Complaint:  Follow-up      HPI 10/21/2020: 20 year old female complaining of hives for the last month daily. Symptoms are worse at night, and with a hot bath. She reports that hives are worse when she becomes over heated. Hives can occur, when she is not overheated. Lesions last less than 24 hours. Lesions do not scar. Lesions itch, but they do not burn. She can not associate hives with water, the sun or exercise. 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 She was prescribed prednisone and famotidine, which helped. She is taking Benadryl, as needed. She is also taking Cetirizine daily.      HPI today: 20 year old here for follow up. She reports that hives have been manageable with Zyrtec BID and Pepcid. She denies tongue or throat swelling. She has noticed an association with contact, food or exposure as an etiology of her symptoms. She denies OTC/herbal medications.      Past Medical History:  See above  Scoliosis as a child    Family History:  Hypertension  Hyperlipidemia  GM- thyroid cancer  Mom and aunt- hyperthyroidism  Dad- diabetic, CAD    Current Outpatient Medications on File Prior to Visit   Medication Sig Dispense Refill    cetirizine (ZYRTEC) 10 MG tablet Take 10 mg by mouth once daily.      dexamethasone (DECADRON) 0.1 % ophthalmic solution 3-4 gtts AU ( yes, ears ) bid with Floxin otioc. (Patient not taking: Reported on 10/21/2020) 5 mL 2    famotidine (PEPCID) 40 MG tablet Take 1 tablet (40 mg total) by mouth once daily. 30 tablet 11    fluticasone (FLONASE) 50 mcg/actuation nasal spray 1 spray by Each Nare route once daily.      hydrOXYzine HCL (ATARAX) 25 MG tablet Take 1 tablet (25 mg total) by mouth 3 (three) times daily. 90 tablet 3    LO LOESTRIN FE 1 mg-10 mcg (24)/10 mcg (2) Tab       loratadine (CLARITIN) 10 mg tablet Take 10 mg by mouth once daily.      mometasone (NASONEX) 50 mcg/actuation nasal spray 2 sprays by Nasal  route once daily.      norethindrone-ethinyl estradiol-iron (MICROGESTIN FE1.5/30) 1.5 mg-30 mcg (21)/75 mg (7) tablet Take 1 tablet by mouth once daily.       No current facility-administered medications on file prior to visit.      Review of patient's allergies indicates:   Allergen Reactions    Alpha-d-galactosidase      Eggs, milk, peanuts, shrimp, and fish     Food allergy formula  [glutamine-c-quercet-selen-brom] Anaphylaxis     Eggs, milk, shrimp, peanuts, fish    Penicillins Rash and Anaphylaxis    Adhesive tape-silicones Rash    Iodine and iodide containing products Rash         Environmental History: No pets. Not a smoker  Review of Systems   Constitutional: Negative for chills and fever.   HENT: Negative for congestion and rhinorrhea.    Eyes: Negative for discharge and itching.   Respiratory: Negative for chest tightness, shortness of breath and wheezing.    Cardiovascular: Negative for chest pain.   Gastrointestinal: Negative for nausea and vomiting.   Skin: Positive for rash. Negative for pallor and wound.   Allergic/Immunologic: Positive for environmental allergies. Negative for food allergies.   Neurological: Negative for facial asymmetry and speech difficulty.   Hematological: Negative for adenopathy. Does not bruise/bleed easily.   Psychiatric/Behavioral: Negative for behavioral problems.        Objective:    Physical Exam  Vitals signs reviewed.   Constitutional:       General: She is not in acute distress.     Appearance: She is well-developed. She is not diaphoretic.   HENT:      Head: Normocephalic and atraumatic.      Right Ear: External ear normal.      Left Ear: External ear normal.      Nose: Nose normal.      Mouth/Throat:      Pharynx: No oropharyngeal exudate.   Eyes:      General: No scleral icterus.        Right eye: No discharge.         Left eye: No discharge.      Pupils: Pupils are equal, round, and reactive to light.   Neck:      Musculoskeletal: Normal range of motion and  neck supple.      Thyroid: No thyromegaly.   Cardiovascular:      Rate and Rhythm: Normal rate and regular rhythm.      Heart sounds: Normal heart sounds. No murmur.   Pulmonary:      Effort: Pulmonary effort is normal. No respiratory distress.      Breath sounds: Normal breath sounds. No stridor. No wheezing or rales.   Abdominal:      General: Bowel sounds are normal. There is no distension.      Palpations: Abdomen is soft.      Tenderness: There is no abdominal tenderness. There is no guarding.   Musculoskeletal: Normal range of motion.   Lymphadenopathy:      Cervical: No cervical adenopathy.   Skin:     General: Skin is warm.      Coloration: Skin is not pale.      Findings: Rash present. No erythema.      Comments: dermographia   Neurological:      Mental Status: She is alert and oriented to person, place, and time.   Psychiatric:         Thought Content: Thought content normal.         Judgment: Judgment normal.               Assessment:       1. Chronic idiopathic urticaria         Plan:       Continue Famotidine and Cetirizine 10 mg BID.  Hydroxyzine prn (She has stated that she has not needed it).    Chronic idiopathic urticaria    RTC 6 weeks or sooner, if needed.      BHASKAR SCHMITT

## 2020-12-06 ENCOUNTER — HOSPITAL ENCOUNTER (EMERGENCY)
Facility: HOSPITAL | Age: 20
Discharge: HOME | End: 2020-12-06
Attending: FAMILY MEDICINE
Payer: COMMERCIAL

## 2020-12-06 VITALS
BODY MASS INDEX: 33.79 KG/M2 | HEART RATE: 87 BPM | HEIGHT: 55 IN | DIASTOLIC BLOOD PRESSURE: 65 MMHG | WEIGHT: 146 LBS | OXYGEN SATURATION: 98 % | SYSTOLIC BLOOD PRESSURE: 114 MMHG | RESPIRATION RATE: 16 BRPM | TEMPERATURE: 99 F

## 2020-12-06 DIAGNOSIS — K62.5 RECTAL BLEEDING: Primary | ICD-10-CM

## 2020-12-06 DIAGNOSIS — K64.9 HEMORRHOIDS, UNSPECIFIED HEMORRHOID TYPE: ICD-10-CM

## 2020-12-06 PROCEDURE — 99282 EMERGENCY DEPT VISIT SF MDM: CPT

## 2020-12-07 NOTE — ED PROVIDER NOTES
Encounter Date: 12/6/2020       History     Chief Complaint   Patient presents with    Rectal Bleeding     Bright red rectal bleeding starting this morning. Drops of blood noted in toilet. Denies black stools and denies pain.      Patient is a 20-year-old female who presents with the rectal bleeding starting this morning.  She reports 6 year history of hemorrhoids after back surgery in 2014.  She states she always has some some amount of blood with bowel movements but today was more than usual and the bleeding did stop.  She reports mild pain with each bowel movement.        Review of patient's allergies indicates:   Allergen Reactions    Alpha-d-galactosidase      Eggs, milk, peanuts, shrimp, and fish     Food allergy formula  [glutamine-c-quercet-selen-brom] Anaphylaxis     Eggs, milk, shrimp, peanuts, fish    Penicillins Rash and Anaphylaxis    Adhesive tape-silicones Rash    Iodine and iodide containing products Rash     Past Medical History:   Diagnosis Date    Allergies     Hemorrhoids      Past Surgical History:   Procedure Laterality Date    BACK SURGERY      TYMPANOSTOMY TUBE PLACEMENT       History reviewed. No pertinent family history.  Social History     Tobacco Use    Smoking status: Never Smoker    Smokeless tobacco: Never Used   Substance Use Topics    Alcohol use: Not Currently    Drug use: Never     Review of Systems   Constitutional: Negative for fever.   HENT: Negative for sore throat.    Respiratory: Negative for shortness of breath.    Cardiovascular: Negative for chest pain.   Gastrointestinal: Positive for anal bleeding and rectal pain. Negative for nausea.   Genitourinary: Negative for dysuria.   Musculoskeletal: Negative for back pain.   Skin: Negative for rash.   Neurological: Negative for weakness.   Hematological: Does not bruise/bleed easily.       Physical Exam     Initial Vitals [12/06/20 1956]   BP Pulse Resp Temp SpO2   114/65 87 16 98.6 °F (37 °C) 98 %      MAP        --         Physical Exam    Nursing note and vitals reviewed.  Constitutional: She appears well-developed and well-nourished.   HENT:   Head: Normocephalic and atraumatic.   Eyes: EOM are normal. Pupils are equal, round, and reactive to light.   Neck: Normal range of motion. Neck supple.   Cardiovascular: Normal rate, regular rhythm, normal heart sounds and intact distal pulses.   Pulmonary/Chest: Breath sounds normal.   Abdominal: Soft. Bowel sounds are normal.   Musculoskeletal: Normal range of motion.   Neurological: She is alert and oriented to person, place, and time. She has normal strength and normal reflexes.   Skin: Skin is warm and dry.         ED Course   Procedures  Labs Reviewed - No data to display       Imaging Results    None          Medical Decision Making:   ED Management:  Patient instructed to follow-up with primary care or GI physician.                             Clinical Impression:     ICD-10-CM ICD-9-CM   1. Rectal bleeding  K62.5 569.3   2. Hemorrhoids, unspecified hemorrhoid type  K64.9 455.6                      Disposition:   Disposition: Discharged     ED Disposition Condition    Discharge Stable        ED Prescriptions     None        Follow-up Information     Follow up With Specialties Details Why Contact Info    Primary care physician   Further evaluation of chronic hemorrhoids                                        Pj Keith NP  12/06/20 2023       Pj Keith NP  01/24/21 [id number]

## 2020-12-07 NOTE — ED NOTES
NEUROLOGICAL:   Patient is awake , alert  and oriented x 4 . Pupils are PERRL. Gait is steady.   Moves all extremities without difficulty.   Patient reports no neuro complaints..  GCS 15    HEENT:   Head appears normocephalic  and symmetric .   Eyes appear WNL to both eyes. Patient reports no complaints  to both eyes .   Ears appear WNL. Patient reports no complaints  to both ears.   Nares appear patent . Patient reports no nose complaints .  Mouth appears moist, pink and teeth intact. Patient reports no mouth complaints.   Throat appears pink and moist . Patient reports no throat complaints.    CARDIOVASCULAR:   S1 and S2 present, no murmurs, gallops, or rubs, rate regular  and pulses palpable (2+)    On palpation no edema noted , noted to none.   Patient reports no CV complaints.  .   Patient vitals are WNL.    RESPIRATORY:   Airway Clear, Open, and Patent.  Respirations are even and unlabored.   Breath sounds clear  to all lung fields.   Patient reports no respiratory complaints.     GASTROINTESTINAL:   Abdomen is soft  and non-tender x 4 quadrants. Bowel sounds are normoactive to all quadrants .   Patient reports rectal bleeding  since this morning. Denies abdominal pain. Denies tarry/black stools.      GENITOURINARY:   Patient reports no  complaints.     MUSCULOSKELETAL:   full range of motion to all extremities, no swelling noted , no tenderness noted and no weakness noted.   Patient reports no musculoskeletal complaints     SKIN:   Skin appears warm , dry , good turgor, color normal for race and intact. Patient reports no skin complaints.

## 2021-10-22 ENCOUNTER — TELEPHONE (OUTPATIENT)
Dept: ALLERGY | Facility: CLINIC | Age: 21
End: 2021-10-22
Payer: COMMERCIAL

## 2021-10-27 ENCOUNTER — OFFICE VISIT (OUTPATIENT)
Dept: ALLERGY | Facility: CLINIC | Age: 21
End: 2021-10-27
Payer: COMMERCIAL

## 2021-10-27 VITALS
BODY MASS INDEX: 30.51 KG/M2 | HEART RATE: 74 BPM | WEIGHT: 131.81 LBS | HEIGHT: 55 IN | TEMPERATURE: 100 F | SYSTOLIC BLOOD PRESSURE: 105 MMHG | DIASTOLIC BLOOD PRESSURE: 66 MMHG

## 2021-10-27 DIAGNOSIS — J30.1 SEASONAL ALLERGIC RHINITIS DUE TO POLLEN: ICD-10-CM

## 2021-10-27 DIAGNOSIS — L50.1 CHRONIC IDIOPATHIC URTICARIA: Primary | ICD-10-CM

## 2021-10-27 PROCEDURE — 99214 PR OFFICE/OUTPT VISIT, EST, LEVL IV, 30-39 MIN: ICD-10-PCS | Mod: S$GLB,,, | Performed by: ALLERGY & IMMUNOLOGY

## 2021-10-27 PROCEDURE — 3078F DIAST BP <80 MM HG: CPT | Mod: CPTII,S$GLB,, | Performed by: ALLERGY & IMMUNOLOGY

## 2021-10-27 PROCEDURE — 3074F SYST BP LT 130 MM HG: CPT | Mod: CPTII,S$GLB,, | Performed by: ALLERGY & IMMUNOLOGY

## 2021-10-27 PROCEDURE — 3008F BODY MASS INDEX DOCD: CPT | Mod: CPTII,S$GLB,, | Performed by: ALLERGY & IMMUNOLOGY

## 2021-10-27 PROCEDURE — 99214 OFFICE O/P EST MOD 30 MIN: CPT | Mod: S$GLB,,, | Performed by: ALLERGY & IMMUNOLOGY

## 2021-10-27 PROCEDURE — 3074F PR MOST RECENT SYSTOLIC BLOOD PRESSURE < 130 MM HG: ICD-10-PCS | Mod: CPTII,S$GLB,, | Performed by: ALLERGY & IMMUNOLOGY

## 2021-10-27 PROCEDURE — 3078F PR MOST RECENT DIASTOLIC BLOOD PRESSURE < 80 MM HG: ICD-10-PCS | Mod: CPTII,S$GLB,, | Performed by: ALLERGY & IMMUNOLOGY

## 2021-10-27 PROCEDURE — 99999 PR PBB SHADOW E&M-EST. PATIENT-LVL III: CPT | Mod: PBBFAC,,, | Performed by: ALLERGY & IMMUNOLOGY

## 2021-10-27 PROCEDURE — 99999 PR PBB SHADOW E&M-EST. PATIENT-LVL III: ICD-10-PCS | Mod: PBBFAC,,, | Performed by: ALLERGY & IMMUNOLOGY

## 2021-10-27 PROCEDURE — 3008F PR BODY MASS INDEX (BMI) DOCUMENTED: ICD-10-PCS | Mod: CPTII,S$GLB,, | Performed by: ALLERGY & IMMUNOLOGY

## 2021-10-27 RX ORDER — FAMOTIDINE 40 MG/1
40 TABLET, FILM COATED ORAL DAILY
Qty: 30 TABLET | Refills: 11 | Status: SHIPPED | OUTPATIENT
Start: 2021-10-27 | End: 2022-12-07

## 2022-01-23 ENCOUNTER — HOSPITAL ENCOUNTER (EMERGENCY)
Facility: HOSPITAL | Age: 22
Discharge: HOME | End: 2022-01-23
Attending: STUDENT IN AN ORGANIZED HEALTH CARE EDUCATION/TRAINING PROGRAM
Payer: COMMERCIAL

## 2022-01-23 VITALS
RESPIRATION RATE: 19 BRPM | SYSTOLIC BLOOD PRESSURE: 109 MMHG | TEMPERATURE: 100 F | WEIGHT: 138.81 LBS | DIASTOLIC BLOOD PRESSURE: 71 MMHG | HEART RATE: 100 BPM | BODY MASS INDEX: 32.26 KG/M2 | OXYGEN SATURATION: 99 %

## 2022-01-23 DIAGNOSIS — B34.9 VIRAL SYNDROME: Primary | ICD-10-CM

## 2022-01-23 DIAGNOSIS — Z20.822 SUSPECTED COVID-19 VIRUS INFECTION: ICD-10-CM

## 2022-01-23 LAB
CTP QC/QA: YES
POC MOLECULAR INFLUENZA A AGN: NEGATIVE
POC MOLECULAR INFLUENZA B AGN: NEGATIVE
SARS-COV-2 RNA RESP QL NAA+PROBE: DETECTED

## 2022-01-23 PROCEDURE — U0002 COVID-19 LAB TEST NON-CDC: HCPCS | Performed by: STUDENT IN AN ORGANIZED HEALTH CARE EDUCATION/TRAINING PROGRAM

## 2022-01-23 PROCEDURE — 63600175 PHARM REV CODE 636 W HCPCS: Performed by: STUDENT IN AN ORGANIZED HEALTH CARE EDUCATION/TRAINING PROGRAM

## 2022-01-23 PROCEDURE — 99284 EMERGENCY DEPT VISIT MOD MDM: CPT | Mod: 25

## 2022-01-23 PROCEDURE — 96372 THER/PROPH/DIAG INJ SC/IM: CPT

## 2022-01-23 PROCEDURE — 25000003 PHARM REV CODE 250: Performed by: STUDENT IN AN ORGANIZED HEALTH CARE EDUCATION/TRAINING PROGRAM

## 2022-01-23 RX ORDER — ONDANSETRON 4 MG/1
4 TABLET, ORALLY DISINTEGRATING ORAL
Status: COMPLETED | OUTPATIENT
Start: 2022-01-23 | End: 2022-01-23

## 2022-01-23 RX ORDER — KETOROLAC TROMETHAMINE 30 MG/ML
30 INJECTION, SOLUTION INTRAMUSCULAR; INTRAVENOUS
Status: COMPLETED | OUTPATIENT
Start: 2022-01-23 | End: 2022-01-23

## 2022-01-23 RX ORDER — GUAIFENESIN/DEXTROMETHORPHAN 100-10MG/5
5 SYRUP ORAL EVERY 8 HOURS PRN
Qty: 120 ML | Refills: 0 | Status: SHIPPED | OUTPATIENT
Start: 2022-01-23 | End: 2022-01-28

## 2022-01-23 RX ORDER — GUAIFENESIN/DEXTROMETHORPHAN 100-10MG/5
5 SYRUP ORAL ONCE
Status: COMPLETED | OUTPATIENT
Start: 2022-01-23 | End: 2022-01-23

## 2022-01-23 RX ORDER — ONDANSETRON 4 MG/1
4 TABLET, ORALLY DISINTEGRATING ORAL EVERY 6 HOURS PRN
Qty: 20 TABLET | Refills: 0 | Status: SHIPPED | OUTPATIENT
Start: 2022-01-23 | End: 2022-01-28

## 2022-01-23 RX ADMIN — KETOROLAC TROMETHAMINE 30 MG: 30 INJECTION, SOLUTION INTRAMUSCULAR at 01:01

## 2022-01-23 RX ADMIN — GUAIFENESIN AND DEXTROMETHORPHAN 5 ML: 100; 10 SYRUP ORAL at 01:01

## 2022-01-23 RX ADMIN — ONDANSETRON 4 MG: 4 TABLET, ORALLY DISINTEGRATING ORAL at 01:01

## 2022-01-23 NOTE — ED TRIAGE NOTES
Pt exposed to +covid pt on Wednesday. Pt co headache, fever, cough with yellow sputum, body aches.

## 2022-01-23 NOTE — ED PROVIDER NOTES
History  Chief Complaint   Patient presents with    Fever    Cough    COVID-19 Concerns     Pt co cough with yellow sputum, fever, body aches, headache. Last tylenol 1000mg at 2230 tonight. No motrin         Patient is a 21-year-old female with history of asthma who presents with concern for COVID-19 infection.  Patient states she had exposure 2 days ago.  Patient yesterday started to experience fevers, body aches, headaches and cough with production of yellow sputum.  Patient has taken Tylenol at home for symptom relief.  Patient noted that she was not vaccinated against COVID due to health reasons.  All other systems reviewed and noted to be negative.          Past Medical History:   Diagnosis Date    Allergies     Asthma     Hemorrhoids     Scoliosis        Past Surgical History:   Procedure Laterality Date    BACK SURGERY      TYMPANOSTOMY TUBE PLACEMENT         History reviewed. No pertinent family history.    Social History     Tobacco Use    Smoking status: Never Smoker    Smokeless tobacco: Never Used   Substance Use Topics    Alcohol use: Not Currently    Drug use: Never       ROS  Review of Systems   Constitutional: Positive for fever.   HENT: Positive for congestion.    Eyes: Negative for visual disturbance.   Respiratory: Positive for cough. Negative for shortness of breath.    Cardiovascular: Negative for chest pain.   Gastrointestinal: Negative for abdominal pain, nausea and vomiting.   Genitourinary: Negative for dysuria.   Musculoskeletal: Positive for arthralgias and myalgias.   Skin: Negative for color change.   Allergic/Immunologic: Negative for immunocompromised state.   Neurological: Positive for headaches.   Hematological: Negative for adenopathy. Does not bruise/bleed easily.       Physical Exam  /71 (Patient Position: Sitting)   Pulse 100   Temp 99.5 °F (37.5 °C) (Oral)   Resp 19   Wt 63 kg (138 lb 12.8 oz)   SpO2 99%   BMI 32.26 kg/m²   Physical  Exam    Constitutional: She appears well-developed and well-nourished. She is cooperative.   HENT:   Head: Normocephalic and atraumatic.   Right Ear: External ear normal.   Left Ear: External ear normal.   Mouth/Throat: Oropharynx is clear and moist.   Eyes: Conjunctivae, EOM and lids are normal. Pupils are equal, round, and reactive to light.   Neck: Phonation normal.   Normal range of motion.  Cardiovascular: Normal rate, regular rhythm and intact distal pulses.   Pulmonary/Chest: Breath sounds normal. No stridor. No respiratory distress. She has no wheezes. She has no rales.   Abdominal: Abdomen is soft. There is no abdominal tenderness.   Musculoskeletal:         General: No edema. Normal range of motion.      Cervical back: Normal range of motion.     Neurological: She is alert and oriented to person, place, and time.   Skin: Skin is warm and dry.   Psychiatric: She has a normal mood and affect. Her speech is normal and behavior is normal.               Labs Reviewed   SARS-COV-2 (COVID-19) QUALITATIVE PCR - Abnormal; Notable for the following components:       Result Value    SARS-CoV2 (COVID-19) Qualitative PCR Detected (*)     All other components within normal limits    Narrative:     Is the patient symptomatic?->Yes  Is this needed for pre-procedure or pre-op testing?->No   POCT INFLUENZA A/B MOLECULAR                          Procedures              MDM  Number of Diagnoses or Management Options  Suspected COVID-19 virus infection  Viral syndrome  Diagnosis management comments:     Patient is a 21-year-old female who presents for concern for COVID-19 infection.  On arrival patient noted to be febrile mildly tachycardic.  Toradol and guaifenesin given for symptom relief.  Physical exam is unremarkable.  Influenza testing was done, it was negative.  COVID testing is pending.  Advised patient continue symptomatic support the outpatient setting.  Will give prescriptions for continued treatment.  They were  advised to check my chart tomorrow for test results regarding her COVID test.  She will be discharged, return precautions were given        Clinical Impression  The primary encounter diagnosis was Viral syndrome. A diagnosis of Suspected COVID-19 virus infection was also pertinent to this visit.       Guanakito Cintron MD  01/23/22 0157

## 2022-01-23 NOTE — Clinical Note
"Jennifer Vasquez" Maycol was seen and treated in our emergency department on 1/23/2022.  She may return to work on 01/29/2022.       If you have any questions or concerns, please don't hesitate to call.       RN    "

## 2022-12-07 ENCOUNTER — OFFICE VISIT (OUTPATIENT)
Dept: INTERNAL MEDICINE | Facility: CLINIC | Age: 22
End: 2022-12-07
Payer: COMMERCIAL

## 2022-12-07 VITALS
HEART RATE: 73 BPM | OXYGEN SATURATION: 99 % | SYSTOLIC BLOOD PRESSURE: 118 MMHG | DIASTOLIC BLOOD PRESSURE: 80 MMHG | BODY MASS INDEX: 29.8 KG/M2 | WEIGHT: 128.75 LBS | HEIGHT: 55 IN

## 2022-12-07 DIAGNOSIS — H69.93 DYSFUNCTION OF BOTH EUSTACHIAN TUBES: ICD-10-CM

## 2022-12-07 DIAGNOSIS — H65.92 OME (OTITIS MEDIA WITH EFFUSION), LEFT: Primary | ICD-10-CM

## 2022-12-07 PROBLEM — H90.0 CONDUCTIVE HEARING LOSS, BILATERAL: Status: ACTIVE | Noted: 2022-12-07

## 2022-12-07 PROBLEM — L50.1 CHRONIC IDIOPATHIC URTICARIA: Status: ACTIVE | Noted: 2022-12-07

## 2022-12-07 PROBLEM — J30.1 SEASONAL ALLERGIC RHINITIS DUE TO POLLEN: Status: ACTIVE | Noted: 2022-12-07

## 2022-12-07 PROCEDURE — 3008F BODY MASS INDEX DOCD: CPT | Mod: CPTII,S$GLB,, | Performed by: FAMILY MEDICINE

## 2022-12-07 PROCEDURE — 99999 PR PBB SHADOW E&M-EST. PATIENT-LVL III: ICD-10-PCS | Mod: PBBFAC,,, | Performed by: FAMILY MEDICINE

## 2022-12-07 PROCEDURE — 3079F DIAST BP 80-89 MM HG: CPT | Mod: CPTII,S$GLB,, | Performed by: FAMILY MEDICINE

## 2022-12-07 PROCEDURE — 3074F SYST BP LT 130 MM HG: CPT | Mod: CPTII,S$GLB,, | Performed by: FAMILY MEDICINE

## 2022-12-07 PROCEDURE — 99204 PR OFFICE/OUTPT VISIT, NEW, LEVL IV, 45-59 MIN: ICD-10-PCS | Mod: S$GLB,,, | Performed by: FAMILY MEDICINE

## 2022-12-07 PROCEDURE — 3079F PR MOST RECENT DIASTOLIC BLOOD PRESSURE 80-89 MM HG: ICD-10-PCS | Mod: CPTII,S$GLB,, | Performed by: FAMILY MEDICINE

## 2022-12-07 PROCEDURE — 99204 OFFICE O/P NEW MOD 45 MIN: CPT | Mod: S$GLB,,, | Performed by: FAMILY MEDICINE

## 2022-12-07 PROCEDURE — 99999 PR PBB SHADOW E&M-EST. PATIENT-LVL III: CPT | Mod: PBBFAC,,, | Performed by: FAMILY MEDICINE

## 2022-12-07 PROCEDURE — 1159F MED LIST DOCD IN RCRD: CPT | Mod: CPTII,S$GLB,, | Performed by: FAMILY MEDICINE

## 2022-12-07 PROCEDURE — 3008F PR BODY MASS INDEX (BMI) DOCUMENTED: ICD-10-PCS | Mod: CPTII,S$GLB,, | Performed by: FAMILY MEDICINE

## 2022-12-07 PROCEDURE — 1159F PR MEDICATION LIST DOCUMENTED IN MEDICAL RECORD: ICD-10-PCS | Mod: CPTII,S$GLB,, | Performed by: FAMILY MEDICINE

## 2022-12-07 PROCEDURE — 3074F PR MOST RECENT SYSTOLIC BLOOD PRESSURE < 130 MM HG: ICD-10-PCS | Mod: CPTII,S$GLB,, | Performed by: FAMILY MEDICINE

## 2022-12-07 RX ORDER — MONTELUKAST SODIUM 10 MG/1
10 TABLET ORAL NIGHTLY
Qty: 30 TABLET | Refills: 0 | Status: SHIPPED | OUTPATIENT
Start: 2022-12-07 | End: 2023-01-06

## 2022-12-07 RX ORDER — GUAIFENESIN AND PSEUDOEPHEDRINE HCL 1200; 120 MG/1; MG/1
1 TABLET, EXTENDED RELEASE ORAL 2 TIMES DAILY
Qty: 60 EACH | Refills: 0 | Status: SHIPPED | OUTPATIENT
Start: 2022-12-07 | End: 2023-01-06

## 2022-12-07 NOTE — PROGRESS NOTES
Subjective:   Patient ID: Jennifer Severino is a 22 y.o. female.  Chief Complaint:  Establish Care and Ear Fullness    Presents for evaluation of ear fullness, pressure, popping.      Reports recently treated for acute otitis media with steroids and antibiotics to urgent care   Symptoms did not significantly improved   No fever or true pain   Has a history of significant your problems to include recurrent otitis media with and without effusion, recurrent otitis externa, multiple PE tube placements, and resultant hearing loss  Currently on Zyrtec 10 mg daily and Flonase nasal spray daily from allergist for her allergic rhinosinusitis  Recently discontinue Pepcid for chronic idiopathic urticaria   No previous treatment with montelukast   No other URI related symptoms with her current ear issues    Ear Fullness   There is pain in both (Last significantly greater than right) ears. This is a chronic problem. The current episode started more than 1 month ago. The problem occurs every few hours. The problem has been waxing and waning. There has been no fever. The patient is experiencing no pain. Associated symptoms include hearing loss. Pertinent negatives include no abdominal pain, coughing, diarrhea, ear discharge, headaches, neck pain, rash, rhinorrhea, sore throat or vomiting. Treatments tried: Antibiotic and steroid pack. The treatment provided significant relief. Her past medical history is significant for a chronic ear infection, hearing loss and a tympanostomy tube.     Current Outpatient Medications:     cetirizine (ZYRTEC) 10 MG tablet, Take 10 mg by mouth once daily., Disp: , Rfl:     fluticasone (FLONASE) 50 mcg/actuation nasal spray, 1 spray by Each Nare route once daily., Disp: , Rfl:     montelukast (SINGULAIR) 10 mg tablet, Take 1 tablet (10 mg total) by mouth every evening., Disp: 30 tablet, Rfl: 0    pseudoephedrine-guaiFENesin (MUCINEX D MAXIMUM STRENGTH) 120-1,200 mg Tb12, Take 1 tablet by mouth 2 (two)  "times daily., Disp: 60 each, Rfl: 0    Review of Systems   Constitutional:  Negative for chills, fatigue and fever.   HENT:  Positive for hearing loss. Negative for congestion, dental problem, drooling, ear discharge, ear pain, facial swelling, nosebleeds, postnasal drip, rhinorrhea, sinus pressure, sinus pain, sneezing, sore throat, tinnitus, trouble swallowing and voice change.    Eyes:  Negative for pain, discharge, redness, itching and visual disturbance.   Respiratory:  Negative for cough, shortness of breath and wheezing.    Cardiovascular:  Negative for chest pain.   Gastrointestinal:  Negative for abdominal pain, diarrhea, nausea and vomiting.   Musculoskeletal:  Negative for myalgias and neck pain.   Skin:  Negative for rash.   Neurological:  Negative for dizziness, facial asymmetry, speech difficulty, light-headedness, numbness and headaches.   Hematological:  Negative for adenopathy.     Objective:   /80 (BP Location: Left arm, Patient Position: Sitting, BP Method: Small (Manual))   Pulse 73   Ht 4' 7" (1.397 m)   Wt 58.4 kg (128 lb 12 oz)   SpO2 99%   BMI 29.92 kg/m²     Physical Exam  Vitals and nursing note reviewed.   Constitutional:       Appearance: She is well-developed and normal weight.      Comments:   Short stature   HENT:      Right Ear: Ear canal and external ear normal. Decreased hearing noted. A PE tube is present. Tympanic membrane is not erythematous.      Left Ear: Decreased hearing noted. A middle ear effusion is present. No PE tube. Tympanic membrane is scarred and retracted. Tympanic membrane is not injected or erythematous.      Nose:      Right Sinus: No maxillary sinus tenderness or frontal sinus tenderness.      Left Sinus: No maxillary sinus tenderness or frontal sinus tenderness.      Mouth/Throat:      Pharynx: Oropharynx is clear. Uvula midline. No pharyngeal swelling, oropharyngeal exudate or posterior oropharyngeal erythema.   Cardiovascular:      Rate and Rhythm: " Normal rate and regular rhythm.   Pulmonary:      Effort: Pulmonary effort is normal.   Musculoskeletal:      Right lower leg: No edema.      Left lower leg: No edema.   Skin:     General: Skin is warm and dry.      Findings: No rash.   Neurological:      Mental Status: She is alert.   Psychiatric:         Attention and Perception: Attention normal.         Mood and Affect: Mood and affect normal.       Assessment:       ICD-10-CM ICD-9-CM   1. OME (otitis media with effusion), left  H65.92 381.4   2. Dysfunction of both eustachian tubes  H69.83 381.81     Plan:   OME (otitis media with effusion), left  Dysfunction of both eustachian tubes  -     montelukast (SINGULAIR) 10 mg tablet; Take 1 tablet (10 mg total) by mouth every evening.  Dispense: 30 tablet; Refill: 0  -     pseudoephedrine-guaiFENesin (MUCINEX D MAXIMUM STRENGTH) 120-1,200 mg Tb12; Take 1 tablet by mouth 2 (two) times daily.  Dispense: 60 each; Refill: 0    Patient education/handout on otitis media with effusion and eustachian tube dysfunction  Antibiotics not indicated with OME unless acute infection present.  Discussed no acute infection based on today's exam  To hopefully resolve effusion without any additional ENT evaluation or surgery, recommend:  Continue Zyrtec 10 mg daily   Continue Flonase daily   Add Singulair 10 mg daily   Add Mucinex D twice a day for 30 days   If no improvement in symptoms, will need to follow-up with ENT  Return to clinic next year for annual physical exam or sooner if needed

## 2023-08-03 ENCOUNTER — TELEPHONE (OUTPATIENT)
Dept: ALLERGY | Facility: CLINIC | Age: 23
End: 2023-08-03
Payer: COMMERCIAL

## 2023-08-03 NOTE — TELEPHONE ENCOUNTER
----- Message from Mara Olivarez sent at 8/3/2023  2:20 PM CDT -----  States she has a Appt 8/10. She would like to get her allergy testing done on the same day. States her insurance is going to end in about two weeks. Please call pt [phone]. Thank you

## 2023-08-03 NOTE — TELEPHONE ENCOUNTER
Called pt and left a vm informing her that we can test her to inhalent or food allergens in office that day as long as she is off of antihistamines 7 days before (starting today). I let her know any other type of allergy testing will not be santos to be done that day.

## 2023-08-10 ENCOUNTER — OFFICE VISIT (OUTPATIENT)
Dept: ALLERGY | Facility: CLINIC | Age: 23
End: 2023-08-10
Payer: COMMERCIAL

## 2023-08-10 ENCOUNTER — LAB VISIT (OUTPATIENT)
Dept: LAB | Facility: HOSPITAL | Age: 23
End: 2023-08-10
Attending: ALLERGY & IMMUNOLOGY
Payer: COMMERCIAL

## 2023-08-10 VITALS
DIASTOLIC BLOOD PRESSURE: 69 MMHG | WEIGHT: 132.94 LBS | HEIGHT: 55 IN | SYSTOLIC BLOOD PRESSURE: 112 MMHG | TEMPERATURE: 99 F | HEART RATE: 80 BPM | BODY MASS INDEX: 30.77 KG/M2

## 2023-08-10 DIAGNOSIS — J30.1 SEASONAL ALLERGIC RHINITIS DUE TO POLLEN: ICD-10-CM

## 2023-08-10 DIAGNOSIS — Z91.018 FOOD ALLERGY: ICD-10-CM

## 2023-08-10 DIAGNOSIS — L50.3 DERMOGRAPHISM: Primary | ICD-10-CM

## 2023-08-10 DIAGNOSIS — H10.13 ALLERGIC CONJUNCTIVITIS OF BOTH EYES: ICD-10-CM

## 2023-08-10 PROCEDURE — 3074F SYST BP LT 130 MM HG: CPT | Mod: CPTII,S$GLB,, | Performed by: ALLERGY & IMMUNOLOGY

## 2023-08-10 PROCEDURE — 1159F MED LIST DOCD IN RCRD: CPT | Mod: CPTII,S$GLB,, | Performed by: ALLERGY & IMMUNOLOGY

## 2023-08-10 PROCEDURE — 99214 PR OFFICE/OUTPT VISIT, EST, LEVL IV, 30-39 MIN: ICD-10-PCS | Mod: S$GLB,,, | Performed by: ALLERGY & IMMUNOLOGY

## 2023-08-10 PROCEDURE — 3008F PR BODY MASS INDEX (BMI) DOCUMENTED: ICD-10-PCS | Mod: CPTII,S$GLB,, | Performed by: ALLERGY & IMMUNOLOGY

## 2023-08-10 PROCEDURE — 86003 ALLG SPEC IGE CRUDE XTRC EA: CPT | Mod: 59 | Performed by: ALLERGY & IMMUNOLOGY

## 2023-08-10 PROCEDURE — 36415 COLL VENOUS BLD VENIPUNCTURE: CPT | Performed by: ALLERGY & IMMUNOLOGY

## 2023-08-10 PROCEDURE — 86003 ALLG SPEC IGE CRUDE XTRC EA: CPT | Performed by: ALLERGY & IMMUNOLOGY

## 2023-08-10 PROCEDURE — 99999 PR PBB SHADOW E&M-EST. PATIENT-LVL III: ICD-10-PCS | Mod: PBBFAC,,, | Performed by: ALLERGY & IMMUNOLOGY

## 2023-08-10 PROCEDURE — 86008 ALLG SPEC IGE RECOMB EA: CPT | Mod: 59 | Performed by: ALLERGY & IMMUNOLOGY

## 2023-08-10 PROCEDURE — 82785 ASSAY OF IGE: CPT | Performed by: ALLERGY & IMMUNOLOGY

## 2023-08-10 PROCEDURE — 99214 OFFICE O/P EST MOD 30 MIN: CPT | Mod: S$GLB,,, | Performed by: ALLERGY & IMMUNOLOGY

## 2023-08-10 PROCEDURE — 99999 PR PBB SHADOW E&M-EST. PATIENT-LVL III: CPT | Mod: PBBFAC,,, | Performed by: ALLERGY & IMMUNOLOGY

## 2023-08-10 PROCEDURE — 3008F BODY MASS INDEX DOCD: CPT | Mod: CPTII,S$GLB,, | Performed by: ALLERGY & IMMUNOLOGY

## 2023-08-10 PROCEDURE — 3078F DIAST BP <80 MM HG: CPT | Mod: CPTII,S$GLB,, | Performed by: ALLERGY & IMMUNOLOGY

## 2023-08-10 PROCEDURE — 1159F PR MEDICATION LIST DOCUMENTED IN MEDICAL RECORD: ICD-10-PCS | Mod: CPTII,S$GLB,, | Performed by: ALLERGY & IMMUNOLOGY

## 2023-08-10 PROCEDURE — 3074F PR MOST RECENT SYSTOLIC BLOOD PRESSURE < 130 MM HG: ICD-10-PCS | Mod: CPTII,S$GLB,, | Performed by: ALLERGY & IMMUNOLOGY

## 2023-08-10 PROCEDURE — 3078F PR MOST RECENT DIASTOLIC BLOOD PRESSURE < 80 MM HG: ICD-10-PCS | Mod: CPTII,S$GLB,, | Performed by: ALLERGY & IMMUNOLOGY

## 2023-08-10 RX ORDER — EPINEPHRINE 0.3 MG/.3ML
2 INJECTION SUBCUTANEOUS ONCE
Qty: 2 EACH | Refills: 0 | Status: SHIPPED | OUTPATIENT
Start: 2023-08-10 | End: 2023-08-10

## 2023-08-10 NOTE — PROGRESS NOTES
Subjective:       Patient ID: Jennifer Severino is a 22 y.o. female.      Chief Complaint:  Follow-up    [facility] 8/102023: 22 year old female with a history of Chronic Idiopathic Urticaria requesting food and inhalant allergy  testing  She reports that hives have continued. She stopped allergy medications for this visit.  When her cat touches her, she has hives.  She denies tongue or throat swelling.    She is requesting food allergy testing. She denies symptoms related to a food.    She reports anaphylaxis- shrimp, peanut, milk, egg, fish  NO accidental ingestions, not required epipen    Runny nose, nasal congestion, itchy and watery eyes. Flonase and Cetirizine help to alleviate symptoms.    OTC allergy eye drops        HPI 10/21/2020: 20 year old female complaining of hives for the last month daily. Symptoms are worse at night, and with a hot bath. She reports that hives are worse when she becomes over heated. Hives can occur, when she is not overheated. Lesions last less than 24 hours. Lesions do not scar. Lesions itch, but they do not burn. She can not associate hives with water, the sun or exercise. 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 She was prescribed prednisone and famotidine, which helped. She is taking Benadryl, as needed. She is also taking Cetirizine daily.      HPI 11/11/2020: 20 year old here for follow up. She reports that hives have been manageable with Zyrtec BID and Pepcid. She denies tongue or throat swelling. She has noticed an association with contact, food or exposure as an etiology of her symptoms. She denies OTC/herbal medications.      HPI 10/27/2021:  No medications for the last 6 days and she has not had hives.  No famotidine for the last seven days.  She is still taking Cetirizine.  No tongue or throat swelling.  Previous Chronic Spontaneous Urticaria work up has been negative.  She is taking Flonase daily as well. She has a history of seasonal allergies. She denies runny nose or nasal  congestion.      Past Medical History:  See above  Scoliosis as a child    Family History:  Hypertension  Hyperlipidemia  GM- thyroid cancer  Mom and aunt- hyperthyroidism  Dad- diabetic, CAD    Current Outpatient Medications on File Prior to Visit   Medication Sig Dispense Refill    azithromycin (Z-JAYNA) 250 MG tablet TAKE 2 TABLETS BY MOUTH FOR 1 DAY THEN TAKE 1 TABLET BY MOUTH DAILY FOR 4 DAYS      cetirizine (ZYRTEC) 10 MG tablet Take 10 mg by mouth once daily.      fluticasone (FLONASE) 50 mcg/actuation nasal spray 1 spray by Each Nare route once daily.      promethazine-dextromethorphan (PROMETHAZINE-DM) 6.25-15 mg/5 mL Syrp Take 5 mLs by mouth every 4 (four) hours as needed.      [DISCONTINUED] EPINEPHrine (EPIPEN) 0.3 mg/0.3 mL AtIn Inject contents of 1 pen (0.3mg) into the muscle once. for 1 dose 2 each 0     No current facility-administered medications on file prior to visit.     Review of patient's allergies indicates:   Allergen Reactions    Alpha-d-galactosidase      Eggs, milk, peanuts, shrimp, and fish     Food allergy formula  [glutamine-c-quercet-selen-brom] Anaphylaxis     Eggs, milk, shrimp, peanuts, fish    Penicillins Rash and Anaphylaxis    Adhesive tape-silicones Rash    Clindamycin Rash    Iodine and iodide containing products Rash         Environmental History:  No pets. Not a smoker  She works at a coffee shop.  Review of Systems   Constitutional:  Negative for chills and fever.   HENT:  Negative for congestion and rhinorrhea.    Eyes:  Negative for discharge and itching.   Respiratory:  Negative for chest tightness, shortness of breath and wheezing.    Cardiovascular:  Negative for chest pain.   Gastrointestinal:  Negative for nausea and vomiting.   Skin:  Negative for pallor, rash and wound.   Allergic/Immunologic: Positive for environmental allergies. Negative for food allergies.   Neurological:  Negative for facial asymmetry and speech difficulty.   Hematological:  Negative for  adenopathy. Does not bruise/bleed easily.   Psychiatric/Behavioral:  Negative for behavioral problems.         Objective:    Physical Exam  Vitals reviewed.   Constitutional:       General: She is not in acute distress.     Appearance: Normal appearance. She is well-developed. She is not ill-appearing, toxic-appearing or diaphoretic.   HENT:      Head: Normocephalic and atraumatic.      Right Ear: Tympanic membrane, ear canal and external ear normal. There is no impacted cerumen.      Left Ear: Tympanic membrane, ear canal and external ear normal. There is no impacted cerumen.      Ears:      Comments: Right green PE tube     Nose: Nose normal. No congestion or rhinorrhea.      Mouth/Throat:      Pharynx: No oropharyngeal exudate or posterior oropharyngeal erythema.      Comments: Bifid uvula  Eyes:      General: No scleral icterus.        Right eye: No discharge.         Left eye: No discharge.      Pupils: Pupils are equal, round, and reactive to light.   Neck:      Thyroid: No thyromegaly.   Cardiovascular:      Rate and Rhythm: Normal rate and regular rhythm.      Heart sounds: Normal heart sounds. No murmur heard.     No friction rub. No gallop.   Pulmonary:      Effort: Pulmonary effort is normal. No respiratory distress.      Breath sounds: Normal breath sounds. No stridor. No wheezing, rhonchi or rales.   Chest:      Chest wall: No tenderness.   Abdominal:      General: Bowel sounds are normal. There is no distension.      Palpations: Abdomen is soft. There is no mass.      Tenderness: There is no abdominal tenderness. There is no guarding or rebound.      Hernia: No hernia is present.   Musculoskeletal:         General: No swelling, tenderness or signs of injury. Normal range of motion.      Cervical back: Normal range of motion and neck supple. No rigidity or tenderness.      Right lower leg: No edema.      Left lower leg: No edema.   Lymphadenopathy:      Cervical: No cervical adenopathy.   Skin:      General: Skin is warm.      Coloration: Skin is not jaundiced or pale.      Findings: Rash present. No bruising, erythema or lesion.      Comments: dermographism   Neurological:      General: No focal deficit present.      Mental Status: She is alert and oriented to person, place, and time.      Gait: Gait normal.   Psychiatric:         Mood and Affect: Mood normal.         Behavior: Behavior normal.         Thought Content: Thought content normal.         Judgment: Judgment normal.         Unable to skin prick test, as she is dermographic.      Assessment:       1. Dermographism    2. Seasonal allergic rhinitis due to pollen    3. Allergic conjunctivitis of both eyes    4. Food allergy           Plan:         Dermographism    Seasonal allergic rhinitis due to pollen  -     Allergen, Pecan Tree IgE; Future; Expected date: 08/10/2023  -     [place], black IgE; Future; Expected date: 08/10/2023  -     [employer], bald IgE; Future; Expected date: 08/10/2023  -     Oak, white IgE; Future; Expected date: 08/10/2023  -     Allergen, Cocklebur; Future; Expected date: 08/10/2023  -     Cat epithelium IgE; Future; Expected date: 08/10/2023  -     RAST Allergen for Eastern Frohna; Future; Expected date: 08/10/2023  -     RAST Allergen Maple ([place]); Future; Expected date: 08/10/2023  -     Allergen, Meadow Grass ([facility]); Future; Expected date: 08/10/2023  -     Allergen-Silver Birch; Future; Expected date: 08/10/2023  -     RAST Allergen [place]; Future; Expected date: 08/10/2023  -     RAST Allergen, Sheep Harrod([place]); Future; Expected date: 08/10/2023  -     Allergen, Hackberry Celtis; Future; Expected date: 08/10/2023  -     Allergen, Elm Cedar; Future; Expected date: 08/10/2023  -     Allergen-[place]; Future; Expected date: 08/10/2023  -     Allergen-Alternaria Alternata; Future; Expected date: 08/10/2023  -     Allergen-Maple Levasy/Frohna; Future; Expected date: 08/10/2023  -     Allergen, White  Hernandez; Future; Expected date: 08/10/2023  -     IgE; Future; Expected date: 08/10/2023  -     B[place]ia grass IgE; Future; Expected date: 08/10/2023  -     Aspergillus fumagatus IgE; Future; Expected date: 08/10/2023  -     Chaetomium globosum IgE; Future; Expected date: 08/10/2023  -     Cockroach, American IgE; Future; Expected date: 08/10/2023  -     Cladosporium IgE; Future; Expected date: 08/10/2023  -     Curvularia lunata IgE; Future; Expected date: 08/10/2023  -     D. farinae IgE; Future; Expected date: 08/10/2023  -     D. pteronyssinus IgE; Future; Expected date: 08/10/2023  -     Dog dander IgE; Future; Expected date: 08/10/2023  -     Plantain, English IgE; Future; Expected date: 08/10/2023  -     Eucalyptus IgE; Future; Expected date: 08/10/2023  -     Pinedo elder, rough IgE; Future; Expected date: 08/10/2023  -     Mugwort IgE; Future; Expected date: 08/10/2023  -     Nettle IgE; Future; Expected date: 08/10/2023  -     Orchard grass IgE; Future; Expected date: 08/10/2023  -     Lester, western white IgE; Future; Expected date: 08/10/2023  -     Privet, common IgE; Future; Expected date: 08/10/2023  -     Ragweed, short, common IgE; Future; Expected date: 08/10/2023  -     Red top grass IgE; Future; Expected date: 08/10/2023  -     Rye grass, cultivated IgE; Future; Expected date: 08/10/2023  -     Thistle, Russian IgE; Future; Expected date: 08/10/2023  -     Stemphyllium IgE; Future; Expected date: 08/10/2023  -     Tray IgE; Future; Expected date: 08/10/2023  -     Ayden grass IgE; Future; Expected date: 08/10/2023    Allergic conjunctivitis of both eyes    Food allergy  -     Allergen, Peanut Components IGE; Future; Expected date: 08/10/2023  -     Allergen, Milk Components IGE; Future; Expected date: 08/10/2023  -     Allergen, Egg Components IGE; Future; Expected date: 08/10/2023  -     Wheat IgE; Future; Expected date: 08/10/2023  -     Soybean IgE; Future; Expected date: 08/10/2023  -     Almonds  IgE; Future; Expected date: 08/10/2023  -     Kittery Point IgE; Future; Expected date: 08/10/2023  -     Sesame Seed IgE; Future; Expected date: 08/10/2023  -     Sunflower, seed IgE; Future; Expected date: 08/10/2023  -     Pecan Nut IgE; Future; Expected date: 08/10/2023  -     Allergen-Codfish; Future; Expected date: 08/10/2023  -     Tuna IgE; Future; Expected date: 08/10/2023  -     Allergen-Catfish; Future; Expected date: 08/10/2023  -     Shrimp IgE; Future; Expected date: 08/10/2023  -     Lobster IgE; Future; Expected date: 08/10/2023  -     Crab IgE; Future; Expected date: 08/10/2023  -     EPINEPHrine (EPIPEN) 0.3 mg/0.3 mL AtIn; Inject contents of 1 pen (0.3mg) into the muscle once. for 1 dose  Dispense: 2 each; Refill: 0      Epipen 2 pack  Continue Zyrtec and Flonase.  RTC 4-6 weeks or sooner, if needed    BHASKAR SCHMITT spent 35 minutes on the day of the visit.  This includes face to face time and non-face to face time preparing to see the patient (eg, review of tests), obtaining and/or reviewing separately obtained history, documenting clinical information in the electronic or other health record, independently interpreting results and communicating results to the patient/family/caregiver, or care coordinator.

## 2023-08-11 LAB — IGE SERPL-ACNC: 50 IU/ML (ref 0–100)

## 2023-08-14 ENCOUNTER — PATIENT MESSAGE (OUTPATIENT)
Dept: ALLERGY | Facility: CLINIC | Age: 23
End: 2023-08-14
Payer: COMMERCIAL

## 2023-08-14 ENCOUNTER — TELEPHONE (OUTPATIENT)
Dept: ALLERGY | Facility: CLINIC | Age: 23
End: 2023-08-14
Payer: COMMERCIAL

## 2023-08-14 LAB
A ALTERNATA IGE QN: <0.1 KU/L
A FUMIGATUS IGE QN: <0.1 KU/L
A-LACTALB IGE QN: 0.29 KU/L
ALLERGEN BOXELDER MAPLE TREE IGE: 0.17 KU/L
ALLERGEN CHAETOMIUM GLOBOSUM IGE: <0.1 KU/L
ALLERGEN MAPLE (BOX ELDER) CLASS: ABNORMAL
ALLERGEN MULBERRY CLASS: NORMAL
ALLERGEN MULBERRY TREE IGE: <0.1 KU/L
ALLERGEN WHITE ASH TREE IGE: 0.16 KU/L
ALLERGEN WHITE PINE TREE IGE: 0.1 KU/L
ALLERGY INTERPRETATION: ABNORMAL
ALMOND IGE QN: 0.19 KU/L
AMER SYCAMORE IGE QN: 0.17 KU/L
B-LACTALB IGE QN: <0.1 KU/L
BAHIA GRASS IGE QN: 0.15 KU/L
BALD CYPRESS IGE QN: <0.1 KU/L
C HERBARUM IGE QN: <0.1 KU/L
C LUNATA IGE QN: <0.1 KU/L
CASEIN IGE QN: <0.1 KU/L
CAT DANDER IGE QN: 5.62 KU/L
CATFISH IGE QN: 0.13 KU/L
CHAETOMIUM GLOB. CLASS: NORMAL
COCKLEBUR IGE QN: 0.17 KU/L
COCKSFOOT IGE QN: 0.26 KU/L
CODFISH IGE QN: <0.1 KU/L
COMMON RAGWEED IGE QN: 0.12 KU/L
COTTONWOOD IGE QN: 0.13 KU/L
COW MILK IGE QN: 0.3 KU/L
CRAB IGE QN: <0.1 KU/L
D FARINAE IGE QN: 8.41 KU/L
D PTERONYSS IGE QN: 11.3 KU/L
DEPRECATED A ALTERNATA IGE RAST QL: NORMAL
DEPRECATED A FUMIGATUS IGE RAST QL: NORMAL
DEPRECATED A-LACTALB IGE RAST QL: ABNORMAL
DEPRECATED ALMOND IGE RAST QL: ABNORMAL
DEPRECATED B-LACTALB IGE RAST QL: ABNORMAL
DEPRECATED BAHIA GRASS IGE RAST QL: ABNORMAL
DEPRECATED BALD CYPRESS IGE RAST QL: NORMAL
DEPRECATED C HERBARUM IGE RAST QL: NORMAL
DEPRECATED C LUNATA IGE RAST QL: NORMAL
DEPRECATED CASEIN IGE RAST QL: ABNORMAL
DEPRECATED CAT DANDER IGE RAST QL: ABNORMAL
DEPRECATED CATFISH IGE RAST QL: ABNORMAL
DEPRECATED COCKLEBUR IGE RAST QL: ABNORMAL
DEPRECATED COCKSFOOT IGE RAST QL: ABNORMAL
DEPRECATED CODFISH IGE RAST QL: NORMAL
DEPRECATED COMMON RAGWEED IGE RAST QL: ABNORMAL
DEPRECATED COTTONWOOD IGE RAST QL: ABNORMAL
DEPRECATED COW MILK IGE RAST QL: ABNORMAL
DEPRECATED CRAB IGE RAST QL: NORMAL
DEPRECATED D FARINAE IGE RAST QL: ABNORMAL
DEPRECATED D PTERONYSS IGE RAST QL: ABNORMAL
DEPRECATED DOG DANDER IGE RAST QL: ABNORMAL
DEPRECATED EGG WHITE IGE RAST QL: ABNORMAL
DEPRECATED EGG YOLK IGE RAST QL: ABNORMAL
DEPRECATED ELDER IGE RAST QL: ABNORMAL
DEPRECATED ENGL PLANTAIN IGE RAST QL: NORMAL
DEPRECATED GUM-TREE IGE RAST QL: NORMAL
DEPRECATED HACKBERRY TREE IGE RAST QL: ABNORMAL
DEPRECATED JOHNSON GRASS IGE RAST QL: ABNORMAL
DEPRECATED KENT BLUE GRASS IGE RAST QL: ABNORMAL
DEPRECATED LOBSTER IGE RAST QL: NORMAL
DEPRECATED LONDON PLANE IGE RAST QL: ABNORMAL
DEPRECATED MUGWORT IGE RAST QL: NORMAL
DEPRECATED NETTLE IGE RAST QL: ABNORMAL
DEPRECATED OVALB IGE RAST QL: ABNORMAL
DEPRECATED OVOMUCOID IGE RAST QL: ABNORMAL
DEPRECATED PEANUT (RARA H) 2 IGE RAST QL: ABNORMAL
DEPRECATED PEANUT (RARA H) 2 IGE RAST QL: ABNORMAL
DEPRECATED PEANUT (RARA H) 3 IGE RAST QL: ABNORMAL
DEPRECATED PEANUT (RARA H) 6 IGE RAST QL: ABNORMAL
DEPRECATED PEANUT (RARA H) 8 IGE RAST QL: ABNORMAL
DEPRECATED PECAN/HICK NUT IGE RAST QL: NORMAL
DEPRECATED PECAN/HICK TREE IGE RAST QL: ABNORMAL
DEPRECATED PER RYE GRASS IGE RAST QL: ABNORMAL
DEPRECATED PRIVET IGE RAST QL: NORMAL
DEPRECATED RED TOP GRASS IGE RAST QL: ABNORMAL
DEPRECATED ROACH IGE RAST QL: NORMAL
DEPRECATED SALTWORT IGE RAST QL: NORMAL
DEPRECATED SESAME SEED IGE RAST QL: ABNORMAL
DEPRECATED SHEEP SORREL IGE RAST QL: NORMAL
DEPRECATED SHRIMP IGE RAST QL: NORMAL
DEPRECATED SILVER BIRCH IGE RAST QL: NORMAL
DEPRECATED SOYBEAN IGE RAST QL: ABNORMAL
DEPRECATED SUNFLOWER SEED IGE RAST QL: ABNORMAL
DEPRECATED TIMOTHY IGE RAST QL: ABNORMAL
DEPRECATED TUNA IGE RAST QL: NORMAL
DEPRECATED WALNUT IGE RAST QL: ABNORMAL
DEPRECATED WHEAT IGE RAST QL: ABNORMAL
DEPRECATED WHITE OAK IGE RAST QL: ABNORMAL
DEPRECATED WILLOW IGE RAST QL: ABNORMAL
DOG DANDER IGE QN: 2.41 KU/L
EGG WHITE IGE QN: 0.57 KU/L
EGG YOLK IGE QN: 0.52 KU/L
ELDER IGE QN: 0.18 KU/L
ELM CEDAR CLASS: ABNORMAL
ELM CEDAR, IGE: 0.12 KU/L
ENGL PLANTAIN IGE QN: <0.1 KU/L
GUM-TREE IGE QN: <0.1 KU/L
HACKBERRY TREE IGE QN: 0.15 KU/L
JOHNSON GRASS IGE QN: 0.12 KU/L
KENT BLUE GRASS IGE QN: 0.35 KU/L
LOBSTER IGE QN: <0.1 KU/L
LONDON PLANE IGE QN: 0.17 KU/L
MUGWORT IGE QN: <0.1 KU/L
NETTLE IGE QN: 0.15 KU/L
OVALB IGE QN: 0.33 KU/L
OVOMUCOID IGE QN: 0.49 KU/L
PEANUT (RARA H) 1 IGE QN: 0.86 KU/L
PEANUT (RARA H) 2 IGE QN: 0.11 KU/L
PEANUT (RARA H) 3 IGE QN: <0.1 KU/L
PEANUT (RARA H) 6 IGE QN: <0.1 KU/L
PEANUT (RARA H) 8 IGE QN: <0.1 KU/L
PEANUT (RARA H) 9 IGE QN: <0.1 KU/L
PEANUT (RARA H) 9 IGE QN: ABNORMAL
PECAN/HICK NUT IGE QN: <0.1 KU/L
PECAN/HICK TREE IGE QN: 0.16 KU/L
PER RYE GRASS IGE QN: 0.26 KU/L
PRIVET IGE QN: <0.1 KU/L
RED TOP GRASS IGE QN: 0.27 KU/L
ROACH IGE QN: <0.1 KU/L
SALTWORT IGE QN: <0.1 KU/L
SESAME SEED IGE QN: 0.25 KU/L
SHEEP SORREL IGE QN: 0.1 KU/L
SHRIMP IGE QN: <0.1 KU/L
SILVER BIRCH IGE QN: <0.1 KU/L
SOYBEAN IGE QN: 0.13 KU/L
STEMPHYLIUM HERBARUM CLASS: NORMAL
STEMPHYLLIUM, IGE: <0.1 KU/L
SUNFLOWER SEED IGE QN: 0.17 KU/L
TIMOTHY IGE QN: 0.3 KU/L
TUNA IGE QN: <0.1 KU/L
WALNUT IGE QN: 0.19 KU/L
WHEAT IGE QN: 0.13 KU/L
WHITE ASH CLASS: ABNORMAL
WHITE OAK IGE QN: 0.14 KU/L
WHITE PINE CLASS: NORMAL
WILLOW IGE QN: 0.14 KU/L

## 2023-08-14 NOTE — TELEPHONE ENCOUNTER
----- Message from Kacey Herrmann sent at 8/14/2023 11:48 AM CDT -----  Contact: [phone]  Patient is calling for LAB results. Please call patient at [phone]. Thanks KB

## 2023-08-14 NOTE — TELEPHONE ENCOUNTER
----- Message from Kacey Herrmann sent at 8/14/2023 11:48 AM CDT -----  Contact: [phone]  Patient is calling for LAB results. Please call patient at [phone]. Thanks KB

## 2023-08-14 NOTE — TELEPHONE ENCOUNTER
----- Message from Kacey Herrmann sent at 8/14/2023 11:48 AM CDT -----  Contact: [phone]  Patient is calling for LAB results. Please call patient at [phone]. Thanks KB

## 2023-08-15 ENCOUNTER — PATIENT MESSAGE (OUTPATIENT)
Dept: ALLERGY | Facility: CLINIC | Age: 23
End: 2023-08-15
Payer: COMMERCIAL

## 2024-12-09 ENCOUNTER — TELEPHONE (OUTPATIENT)
Dept: ALLERGY | Facility: CLINIC | Age: 24
End: 2024-12-09
Payer: COMMERCIAL

## 2024-12-09 NOTE — TELEPHONE ENCOUNTER
----- Message from Fior sent at 12/9/2024 12:38 PM CST -----  Type:  Needs Medical Advice    Who Called: JESSEE VAZQUEZ [[id number]]  Symptoms (please be specific):    How long has patient had these symptoms:    Pharmacy name and phone #:    Would the patient rather a call back or a response via [facility]?   Best Call Back Number:  [phone]  Additional Information:  Patient called in regards to Immunization shots. Patient needs a letter of exemption from immunization shots . Please call

## 2024-12-09 NOTE — TELEPHONE ENCOUNTER
Spoke with both pt and Dr Huddleston. We are unable to write a letter of exemption for her MMR and flu vaccines because of egg allergy. There is no contraindication between her egg allergy and these vaccines.  Pt voiced understanding.